# Patient Record
Sex: MALE | Race: WHITE | NOT HISPANIC OR LATINO | Employment: OTHER | URBAN - METROPOLITAN AREA
[De-identification: names, ages, dates, MRNs, and addresses within clinical notes are randomized per-mention and may not be internally consistent; named-entity substitution may affect disease eponyms.]

---

## 2018-01-15 PROBLEM — M25.561 CHRONIC PAIN OF BOTH KNEES: Status: ACTIVE | Noted: 2018-01-15

## 2018-01-15 PROBLEM — M17.0 PRIMARY OSTEOARTHRITIS OF BOTH KNEES: Status: ACTIVE | Noted: 2018-01-15

## 2018-01-15 PROBLEM — E66.9 OBESITY (BMI 35.0-39.9 WITHOUT COMORBIDITY): Status: ACTIVE | Noted: 2018-01-15

## 2018-01-15 PROBLEM — I10 ESSENTIAL HYPERTENSION: Status: ACTIVE | Noted: 2018-01-15

## 2018-01-15 PROBLEM — M25.562 CHRONIC PAIN OF BOTH KNEES: Status: ACTIVE | Noted: 2018-01-15

## 2018-01-15 PROBLEM — G89.29 CHRONIC PAIN OF BOTH KNEES: Status: ACTIVE | Noted: 2018-01-15

## 2019-01-11 PROBLEM — Z13.220 SCREENING CHOLESTEROL LEVEL: Status: ACTIVE | Noted: 2019-01-11

## 2019-01-11 PROBLEM — Z12.5 SCREENING PSA (PROSTATE SPECIFIC ANTIGEN): Status: ACTIVE | Noted: 2019-01-11

## 2019-01-11 PROBLEM — Z00.00 PREVENTATIVE HEALTH CARE: Status: ACTIVE | Noted: 2019-01-11

## 2019-01-11 PROBLEM — Z13.1 SCREENING FOR DIABETES MELLITUS: Status: ACTIVE | Noted: 2019-01-11

## 2019-01-11 PROBLEM — Z23 NEED FOR IMMUNIZATION AGAINST INFLUENZA: Status: ACTIVE | Noted: 2019-01-11

## 2019-01-16 PROCEDURE — 81001 URINALYSIS AUTO W/SCOPE: CPT | Performed by: INTERNAL MEDICINE

## 2020-06-01 PROBLEM — M21.619 BUNION: Status: ACTIVE | Noted: 2020-06-01

## 2020-06-01 PROBLEM — M79.671 PAIN IN BOTH FEET: Status: ACTIVE | Noted: 2020-06-01

## 2020-06-01 PROBLEM — M79.672 PAIN IN BOTH FEET: Status: ACTIVE | Noted: 2020-06-01

## 2020-06-01 PROBLEM — Z01.818 PRE-OPERATIVE EXAMINATION: Status: ACTIVE | Noted: 2020-06-01

## 2020-06-01 PROBLEM — Z13.6 SCREENING FOR CARDIOVASCULAR CONDITION: Status: ACTIVE | Noted: 2020-06-01

## 2020-06-01 PROBLEM — R73.01 IMPAIRED FASTING BLOOD SUGAR: Status: ACTIVE | Noted: 2020-06-01

## 2020-06-01 PROBLEM — R94.31 ABNORMAL ECG: Status: ACTIVE | Noted: 2020-06-01

## 2021-07-21 PROBLEM — Z00.00 MEDICARE ANNUAL WELLNESS VISIT, SUBSEQUENT: Status: ACTIVE | Noted: 2021-07-21

## 2021-07-21 PROBLEM — Z13.228 SCREENING FOR METABOLIC DISORDER: Status: ACTIVE | Noted: 2021-07-21

## 2021-07-21 PROBLEM — D22.9 ATYPICAL NEVUS: Status: ACTIVE | Noted: 2021-07-21

## 2021-07-21 PROBLEM — R94.31 ABNORMAL ECG: Status: RESOLVED | Noted: 2020-06-01 | Resolved: 2021-07-21

## 2021-07-21 PROBLEM — H91.13 PRESBYCUSIS OF BOTH EARS: Status: ACTIVE | Noted: 2021-07-21

## 2021-07-21 PROBLEM — Z01.818 PRE-OPERATIVE EXAMINATION: Status: RESOLVED | Noted: 2020-06-01 | Resolved: 2021-07-21

## 2024-02-16 DIAGNOSIS — J84.112 IDIOPATHIC PULMONARY FIBROSIS (CMD): Primary | ICD-10-CM

## 2024-02-27 ENCOUNTER — HOSPITAL ENCOUNTER (OUTPATIENT)
Dept: RESPIRATORY THERAPY | Age: 74
Discharge: HOME | End: 2024-02-27
Attending: INTERNAL MEDICINE

## 2024-02-27 DIAGNOSIS — J84.112 IDIOPATHIC PULMONARY FIBROSIS (CMD): ICD-10-CM

## 2024-02-27 PROCEDURE — 94453 HAST W/SUPPL OXYGEN TITRJ: CPT

## 2025-03-27 RX ORDER — PIRFENIDONE 267 MG/1
3 CAPSULE ORAL 3 TIMES DAILY
COMMUNITY

## 2025-03-27 RX ORDER — METOPROLOL SUCCINATE 25 MG/1
25 TABLET, EXTENDED RELEASE ORAL DAILY
COMMUNITY

## 2025-03-27 RX ORDER — NAPROXEN 500 MG/1
500 TABLET ORAL 2 TIMES DAILY WITH MEALS
COMMUNITY
End: 2025-04-23

## 2025-03-28 ENCOUNTER — LABORATORY ENCOUNTER (OUTPATIENT)
Dept: LAB | Facility: HOSPITAL | Age: 75
End: 2025-03-28
Attending: ORTHOPAEDIC SURGERY
Payer: MEDICARE

## 2025-03-28 DIAGNOSIS — Z13.220 SCREENING CHOLESTEROL LEVEL: ICD-10-CM

## 2025-03-28 DIAGNOSIS — M17.0 PRIMARY OSTEOARTHRITIS OF BOTH KNEES: ICD-10-CM

## 2025-03-28 DIAGNOSIS — E66.9 OBESITY (BMI 35.0-39.9 WITHOUT COMORBIDITY): ICD-10-CM

## 2025-03-28 DIAGNOSIS — Z23 NEED FOR IMMUNIZATION AGAINST INFLUENZA: ICD-10-CM

## 2025-03-28 DIAGNOSIS — R73.01 IMPAIRED FASTING BLOOD SUGAR: ICD-10-CM

## 2025-03-28 DIAGNOSIS — M21.619 BUNION: ICD-10-CM

## 2025-03-28 DIAGNOSIS — M25.562 CHRONIC PAIN OF BOTH KNEES: ICD-10-CM

## 2025-03-28 DIAGNOSIS — H91.13 PRESBYCUSIS OF BOTH EARS: ICD-10-CM

## 2025-03-28 DIAGNOSIS — M79.671 PAIN IN BOTH FEET: ICD-10-CM

## 2025-03-28 DIAGNOSIS — D22.9 ATYPICAL NEVUS: ICD-10-CM

## 2025-03-28 DIAGNOSIS — Z01.818 PRE-OP TESTING: ICD-10-CM

## 2025-03-28 DIAGNOSIS — Z13.228 SCREENING FOR METABOLIC DISORDER: ICD-10-CM

## 2025-03-28 DIAGNOSIS — Z00.00 MEDICARE ANNUAL WELLNESS VISIT, SUBSEQUENT: ICD-10-CM

## 2025-03-28 DIAGNOSIS — M79.672 PAIN IN BOTH FEET: ICD-10-CM

## 2025-03-28 DIAGNOSIS — Z00.00 PREVENTATIVE HEALTH CARE: ICD-10-CM

## 2025-03-28 DIAGNOSIS — I10 ESSENTIAL HYPERTENSION: ICD-10-CM

## 2025-03-28 DIAGNOSIS — M25.561 CHRONIC PAIN OF BOTH KNEES: ICD-10-CM

## 2025-03-28 DIAGNOSIS — Z13.6 SCREENING FOR CARDIOVASCULAR CONDITION: ICD-10-CM

## 2025-03-28 DIAGNOSIS — G89.29 CHRONIC PAIN OF BOTH KNEES: ICD-10-CM

## 2025-03-28 LAB
ANTIBODY SCREEN: NEGATIVE
APTT PPP: 27.3 SECONDS (ref 23–36)
INR BLD: 0.99 (ref 0.8–1.2)
PROTHROMBIN TIME: 13.2 SECONDS (ref 11.6–14.8)
RH BLOOD TYPE: POSITIVE

## 2025-03-28 PROCEDURE — 36415 COLL VENOUS BLD VENIPUNCTURE: CPT

## 2025-03-28 PROCEDURE — 86850 RBC ANTIBODY SCREEN: CPT

## 2025-03-28 PROCEDURE — 85730 THROMBOPLASTIN TIME PARTIAL: CPT

## 2025-03-28 PROCEDURE — 86901 BLOOD TYPING SEROLOGIC RH(D): CPT

## 2025-03-28 PROCEDURE — 86900 BLOOD TYPING SEROLOGIC ABO: CPT

## 2025-03-28 PROCEDURE — 85610 PROTHROMBIN TIME: CPT

## 2025-04-08 ENCOUNTER — ANESTHESIA EVENT (OUTPATIENT)
Dept: SURGERY | Facility: HOSPITAL | Age: 75
End: 2025-04-08
Payer: MEDICARE

## 2025-04-21 ENCOUNTER — HOSPITAL ENCOUNTER (INPATIENT)
Facility: HOSPITAL | Age: 75
LOS: 2 days | Discharge: HOME HEALTH | End: 2025-04-23
Attending: ORTHOPAEDIC SURGERY | Admitting: ORTHOPAEDIC SURGERY
Payer: MEDICARE

## 2025-04-21 ENCOUNTER — ANESTHESIA (OUTPATIENT)
Dept: SURGERY | Facility: HOSPITAL | Age: 75
End: 2025-04-21
Payer: MEDICARE

## 2025-04-21 ENCOUNTER — APPOINTMENT (OUTPATIENT)
Dept: GENERAL RADIOLOGY | Facility: HOSPITAL | Age: 75
End: 2025-04-21
Attending: ORTHOPAEDIC SURGERY
Payer: MEDICARE

## 2025-04-21 DIAGNOSIS — Z13.6 SCREENING FOR CARDIOVASCULAR CONDITION: ICD-10-CM

## 2025-04-21 DIAGNOSIS — Z23 NEED FOR IMMUNIZATION AGAINST INFLUENZA: ICD-10-CM

## 2025-04-21 DIAGNOSIS — G89.29 CHRONIC PAIN OF BOTH KNEES: ICD-10-CM

## 2025-04-21 DIAGNOSIS — M21.619 BUNION: ICD-10-CM

## 2025-04-21 DIAGNOSIS — Z00.00 MEDICARE ANNUAL WELLNESS VISIT, SUBSEQUENT: ICD-10-CM

## 2025-04-21 DIAGNOSIS — M79.671 PAIN IN BOTH FEET: ICD-10-CM

## 2025-04-21 DIAGNOSIS — R73.01 IMPAIRED FASTING BLOOD SUGAR: ICD-10-CM

## 2025-04-21 DIAGNOSIS — H91.13 PRESBYCUSIS OF BOTH EARS: ICD-10-CM

## 2025-04-21 DIAGNOSIS — M25.561 CHRONIC PAIN OF BOTH KNEES: ICD-10-CM

## 2025-04-21 DIAGNOSIS — M25.562 CHRONIC PAIN OF BOTH KNEES: ICD-10-CM

## 2025-04-21 DIAGNOSIS — M17.0 PRIMARY OSTEOARTHRITIS OF BOTH KNEES: ICD-10-CM

## 2025-04-21 DIAGNOSIS — Z13.228 SCREENING FOR METABOLIC DISORDER: ICD-10-CM

## 2025-04-21 DIAGNOSIS — M43.17 SPONDYLOLISTHESIS AT L5-S1 LEVEL: ICD-10-CM

## 2025-04-21 DIAGNOSIS — M79.672 PAIN IN BOTH FEET: ICD-10-CM

## 2025-04-21 DIAGNOSIS — Z13.220 SCREENING CHOLESTEROL LEVEL: ICD-10-CM

## 2025-04-21 DIAGNOSIS — E66.9 OBESITY (BMI 35.0-39.9 WITHOUT COMORBIDITY): ICD-10-CM

## 2025-04-21 DIAGNOSIS — I10 ESSENTIAL HYPERTENSION: ICD-10-CM

## 2025-04-21 DIAGNOSIS — D22.9 ATYPICAL NEVUS: Primary | ICD-10-CM

## 2025-04-21 DIAGNOSIS — Z00.00 PREVENTATIVE HEALTH CARE: ICD-10-CM

## 2025-04-21 PROBLEM — M54.16 LUMBAR RADICULOPATHY: Status: ACTIVE | Noted: 2025-04-21

## 2025-04-21 LAB — RH BLOOD TYPE: POSITIVE

## 2025-04-21 PROCEDURE — 0SG30AJ FUSION OF LUMBOSACRAL JOINT WITH INTERBODY FUSION DEVICE, POSTERIOR APPROACH, ANTERIOR COLUMN, OPEN APPROACH: ICD-10-PCS | Performed by: ORTHOPAEDIC SURGERY

## 2025-04-21 PROCEDURE — 00NY0ZZ RELEASE LUMBAR SPINAL CORD, OPEN APPROACH: ICD-10-PCS | Performed by: ORTHOPAEDIC SURGERY

## 2025-04-21 PROCEDURE — P9045 ALBUMIN (HUMAN), 5%, 250 ML: HCPCS

## 2025-04-21 PROCEDURE — 4A11X4G MONITORING OF PERIPHERAL NERVOUS ELECTRICAL ACTIVITY, INTRAOPERATIVE, EXTERNAL APPROACH: ICD-10-PCS | Performed by: ORTHOPAEDIC SURGERY

## 2025-04-21 PROCEDURE — 01NB0ZZ RELEASE LUMBAR NERVE, OPEN APPROACH: ICD-10-PCS | Performed by: ORTHOPAEDIC SURGERY

## 2025-04-21 PROCEDURE — 94660 CPAP INITIATION&MGMT: CPT

## 2025-04-21 PROCEDURE — XNH7058 INSERTION OF INTERNAL FIXATION DEVICE WITH TULIP CONNECTOR INTO LEFT PELVIC BONE, OPEN APPROACH, NEW TECHNOLOGY GROUP 8: ICD-10-PCS | Performed by: ORTHOPAEDIC SURGERY

## 2025-04-21 PROCEDURE — 76000 FLUOROSCOPY <1 HR PHYS/QHP: CPT | Performed by: ORTHOPAEDIC SURGERY

## 2025-04-21 PROCEDURE — XNH6058 INSERTION OF INTERNAL FIXATION DEVICE WITH TULIP CONNECTOR INTO RIGHT PELVIC BONE, OPEN APPROACH, NEW TECHNOLOGY GROUP 8: ICD-10-PCS | Performed by: ORTHOPAEDIC SURGERY

## 2025-04-21 PROCEDURE — C1889 IMPLANT/INSERT DEVICE, NOC: HCPCS | Performed by: ORTHOPAEDIC SURGERY

## 2025-04-21 PROCEDURE — 0SG3071 FUSION OF LUMBOSACRAL JOINT WITH AUTOLOGOUS TISSUE SUBSTITUTE, POSTERIOR APPROACH, POSTERIOR COLUMN, OPEN APPROACH: ICD-10-PCS | Performed by: ORTHOPAEDIC SURGERY

## 2025-04-21 PROCEDURE — 94760 N-INVAS EAR/PLS OXIMETRY 1: CPT

## 2025-04-21 PROCEDURE — 0ST40ZZ RESECTION OF LUMBOSACRAL DISC, OPEN APPROACH: ICD-10-PCS | Performed by: ORTHOPAEDIC SURGERY

## 2025-04-21 DEVICE — SHANK SPNL SCR 7.5X45MM 2C MOD RELINE MAS: Type: IMPLANTABLE DEVICE | Site: BACK | Status: FUNCTIONAL

## 2025-04-21 DEVICE — I-FACTOR PUTTY, 1.0CC SYRINGE
Type: IMPLANTABLE DEVICE | Site: BACK | Status: FUNCTIONAL
Brand: I-FACTOR PEPTIDE ENHANCED BONE GRAFT

## 2025-04-21 DEVICE — BONE GRAFT KIT 7510200 INFUSE SMALL
Type: IMPLANTABLE DEVICE | Site: BACK | Status: FUNCTIONAL
Brand: INFUSE® BONE GRAFT

## 2025-04-21 DEVICE — IMPLANTABLE DEVICE: Type: IMPLANTABLE DEVICE | Site: BACK | Status: FUNCTIONAL

## 2025-04-21 DEVICE — K WIRE FIX NIT BVL BLNT TIP PRECEPT: Type: IMPLANTABLE DEVICE | Site: BACK

## 2025-04-21 DEVICE — TLX20, 7X11X26MM 20°
Type: IMPLANTABLE DEVICE | Site: BACK | Status: FUNCTIONAL
Brand: TLX

## 2025-04-21 DEVICE — GRAFT BNE SUB 5CC M MTRX OSTEOCEL PRO: Type: IMPLANTABLE DEVICE | Site: BACK | Status: FUNCTIONAL

## 2025-04-21 DEVICE — SCREW SPNL 5.5MM LOK OPN TULIP RELINE: Type: IMPLANTABLE DEVICE | Site: BACK | Status: FUNCTIONAL

## 2025-04-21 DEVICE — HEAD SPNL SCR MULTIAXIAL EXTN MOD REDUC TULIP: Type: IMPLANTABLE DEVICE | Site: BACK | Status: FUNCTIONAL

## 2025-04-21 DEVICE — GRAFT BNE SUB MTRX C OSTEOCEL PRO: Type: IMPLANTABLE DEVICE | Site: BACK | Status: FUNCTIONAL

## 2025-04-21 DEVICE — DURASEAL® EXACT SPINAL SEALANT SYSTEM 5ML 5 PACK
Type: IMPLANTABLE DEVICE | Site: BACK | Status: FUNCTIONAL
Brand: DURASEAL EXACT SPINAL SEALANT SYSTEM

## 2025-04-21 RX ORDER — HYDROMORPHONE HYDROCHLORIDE 1 MG/ML
0.2 INJECTION, SOLUTION INTRAMUSCULAR; INTRAVENOUS; SUBCUTANEOUS EVERY 5 MIN PRN
Status: DISCONTINUED | OUTPATIENT
Start: 2025-04-21 | End: 2025-04-21 | Stop reason: HOSPADM

## 2025-04-21 RX ORDER — ACETAMINOPHEN 10 MG/ML
INJECTION, SOLUTION INTRAVENOUS AS NEEDED
Status: DISCONTINUED | OUTPATIENT
Start: 2025-04-21 | End: 2025-04-21 | Stop reason: SURG

## 2025-04-21 RX ORDER — BUPIVACAINE HYDROCHLORIDE 5 MG/ML
INJECTION, SOLUTION EPIDURAL; INTRACAUDAL; PERINEURAL AS NEEDED
Status: DISCONTINUED | OUTPATIENT
Start: 2025-04-21 | End: 2025-04-21 | Stop reason: HOSPADM

## 2025-04-21 RX ORDER — ACETAMINOPHEN 500 MG
1000 TABLET ORAL ONCE
Status: DISCONTINUED | OUTPATIENT
Start: 2025-04-21 | End: 2025-04-21 | Stop reason: HOSPADM

## 2025-04-21 RX ORDER — METOCLOPRAMIDE HYDROCHLORIDE 5 MG/ML
10 INJECTION INTRAMUSCULAR; INTRAVENOUS EVERY 8 HOURS PRN
Status: DISCONTINUED | OUTPATIENT
Start: 2025-04-21 | End: 2025-04-21 | Stop reason: HOSPADM

## 2025-04-21 RX ORDER — ONDANSETRON 2 MG/ML
4 INJECTION INTRAMUSCULAR; INTRAVENOUS EVERY 6 HOURS PRN
Status: DISCONTINUED | OUTPATIENT
Start: 2025-04-21 | End: 2025-04-21 | Stop reason: HOSPADM

## 2025-04-21 RX ORDER — DIAZEPAM 10 MG/2ML
5 INJECTION, SOLUTION INTRAMUSCULAR; INTRAVENOUS ONCE AS NEEDED
Status: DISCONTINUED | OUTPATIENT
Start: 2025-04-21 | End: 2025-04-21 | Stop reason: HOSPADM

## 2025-04-21 RX ORDER — VANCOMYCIN HYDROCHLORIDE 1 G/20ML
INJECTION, POWDER, LYOPHILIZED, FOR SOLUTION INTRAVENOUS AS NEEDED
Status: DISCONTINUED | OUTPATIENT
Start: 2025-04-21 | End: 2025-04-21 | Stop reason: HOSPADM

## 2025-04-21 RX ORDER — LIDOCAINE HYDROCHLORIDE AND EPINEPHRINE 10; 10 MG/ML; UG/ML
INJECTION, SOLUTION INFILTRATION; PERINEURAL AS NEEDED
Status: DISCONTINUED | OUTPATIENT
Start: 2025-04-21 | End: 2025-04-21 | Stop reason: HOSPADM

## 2025-04-21 RX ORDER — ONDANSETRON 2 MG/ML
4 INJECTION INTRAMUSCULAR; INTRAVENOUS EVERY 6 HOURS PRN
Status: DISCONTINUED | OUTPATIENT
Start: 2025-04-21 | End: 2025-04-23

## 2025-04-21 RX ORDER — SENNOSIDES 8.6 MG
17.2 TABLET ORAL NIGHTLY
Status: DISCONTINUED | OUTPATIENT
Start: 2025-04-21 | End: 2025-04-23

## 2025-04-21 RX ORDER — DEXAMETHASONE SODIUM PHOSPHATE 4 MG/ML
VIAL (ML) INJECTION AS NEEDED
Status: DISCONTINUED | OUTPATIENT
Start: 2025-04-21 | End: 2025-04-21 | Stop reason: SURG

## 2025-04-21 RX ORDER — DOCUSATE SODIUM 100 MG/1
100 CAPSULE, LIQUID FILLED ORAL 2 TIMES DAILY
Status: DISCONTINUED | OUTPATIENT
Start: 2025-04-21 | End: 2025-04-23

## 2025-04-21 RX ORDER — NALOXONE HYDROCHLORIDE 0.4 MG/ML
80 INJECTION, SOLUTION INTRAMUSCULAR; INTRAVENOUS; SUBCUTANEOUS AS NEEDED
Status: DISCONTINUED | OUTPATIENT
Start: 2025-04-21 | End: 2025-04-21 | Stop reason: HOSPADM

## 2025-04-21 RX ORDER — ONDANSETRON 2 MG/ML
INJECTION INTRAMUSCULAR; INTRAVENOUS AS NEEDED
Status: DISCONTINUED | OUTPATIENT
Start: 2025-04-21 | End: 2025-04-21 | Stop reason: SURG

## 2025-04-21 RX ORDER — DIPHENHYDRAMINE HYDROCHLORIDE 50 MG/ML
12.5 INJECTION, SOLUTION INTRAMUSCULAR; INTRAVENOUS AS NEEDED
Status: DISCONTINUED | OUTPATIENT
Start: 2025-04-21 | End: 2025-04-21 | Stop reason: HOSPADM

## 2025-04-21 RX ORDER — METOCLOPRAMIDE HYDROCHLORIDE 5 MG/ML
10 INJECTION INTRAMUSCULAR; INTRAVENOUS EVERY 8 HOURS PRN
Status: DISCONTINUED | OUTPATIENT
Start: 2025-04-21 | End: 2025-04-23

## 2025-04-21 RX ORDER — CYCLOBENZAPRINE HCL 10 MG
10 TABLET ORAL 3 TIMES DAILY PRN
COMMUNITY

## 2025-04-21 RX ORDER — ASPIRIN 325 MG
325 TABLET ORAL DAILY
Status: ON HOLD | COMMUNITY
End: 2025-04-23

## 2025-04-21 RX ORDER — POLYETHYLENE GLYCOL 3350 17 G/17G
17 POWDER, FOR SOLUTION ORAL DAILY PRN
Status: DISCONTINUED | OUTPATIENT
Start: 2025-04-21 | End: 2025-04-23

## 2025-04-21 RX ORDER — CYCLOBENZAPRINE HCL 5 MG
2.5 TABLET ORAL EVERY 6 HOURS PRN
Status: DISCONTINUED | OUTPATIENT
Start: 2025-04-21 | End: 2025-04-23

## 2025-04-21 RX ORDER — OXYCODONE AND ACETAMINOPHEN 5; 325 MG/1; MG/1
1 TABLET ORAL EVERY 6 HOURS PRN
COMMUNITY

## 2025-04-21 RX ORDER — TRANEXAMIC ACID 10 MG/ML
INJECTION, SOLUTION INTRAVENOUS AS NEEDED
Status: DISCONTINUED | OUTPATIENT
Start: 2025-04-21 | End: 2025-04-21 | Stop reason: SURG

## 2025-04-21 RX ORDER — MULTIVIT WITH MINERALS/LUTEIN
1000 TABLET ORAL DAILY
COMMUNITY

## 2025-04-21 RX ORDER — REMIFENTANIL HYDROCHLORIDE 1 MG/ML
1 INJECTION, POWDER, LYOPHILIZED, FOR SOLUTION INTRAVENOUS ONCE
Refills: 0 | Status: DISCONTINUED | OUTPATIENT
Start: 2025-04-21 | End: 2025-04-21 | Stop reason: HOSPADM

## 2025-04-21 RX ORDER — PHENYLEPHRINE HCL 10 MG/ML
VIAL (ML) INJECTION AS NEEDED
Status: DISCONTINUED | OUTPATIENT
Start: 2025-04-21 | End: 2025-04-21 | Stop reason: SURG

## 2025-04-21 RX ORDER — SODIUM CHLORIDE, SODIUM LACTATE, POTASSIUM CHLORIDE, CALCIUM CHLORIDE 600; 310; 30; 20 MG/100ML; MG/100ML; MG/100ML; MG/100ML
INJECTION, SOLUTION INTRAVENOUS CONTINUOUS
Status: DISCONTINUED | OUTPATIENT
Start: 2025-04-21 | End: 2025-04-21 | Stop reason: HOSPADM

## 2025-04-21 RX ORDER — ALBUMIN HUMAN 50 G/1000ML
SOLUTION INTRAVENOUS
Status: COMPLETED
Start: 2025-04-21 | End: 2025-04-21

## 2025-04-21 RX ORDER — HYDROMORPHONE HYDROCHLORIDE 1 MG/ML
0.4 INJECTION, SOLUTION INTRAMUSCULAR; INTRAVENOUS; SUBCUTANEOUS EVERY 5 MIN PRN
Status: DISCONTINUED | OUTPATIENT
Start: 2025-04-21 | End: 2025-04-21 | Stop reason: HOSPADM

## 2025-04-21 RX ORDER — VANCOMYCIN 1.75 GRAM/500 ML IN 0.9 % SODIUM CHLORIDE INTRAVENOUS
15 ONCE
Status: COMPLETED | OUTPATIENT
Start: 2025-04-22 | End: 2025-04-22

## 2025-04-21 RX ORDER — ALBUMIN HUMAN 50 G/1000ML
12.5 SOLUTION INTRAVENOUS ONCE
Status: COMPLETED | OUTPATIENT
Start: 2025-04-21 | End: 2025-04-21

## 2025-04-21 RX ORDER — MEPERIDINE HYDROCHLORIDE 25 MG/ML
12.5 INJECTION INTRAMUSCULAR; INTRAVENOUS; SUBCUTANEOUS AS NEEDED
Status: DISCONTINUED | OUTPATIENT
Start: 2025-04-21 | End: 2025-04-21 | Stop reason: HOSPADM

## 2025-04-21 RX ORDER — HYDROMORPHONE HYDROCHLORIDE 1 MG/ML
0.6 INJECTION, SOLUTION INTRAMUSCULAR; INTRAVENOUS; SUBCUTANEOUS EVERY 5 MIN PRN
Status: DISCONTINUED | OUTPATIENT
Start: 2025-04-21 | End: 2025-04-21 | Stop reason: HOSPADM

## 2025-04-21 RX ORDER — ROCURONIUM BROMIDE 10 MG/ML
INJECTION, SOLUTION INTRAVENOUS AS NEEDED
Status: DISCONTINUED | OUTPATIENT
Start: 2025-04-21 | End: 2025-04-21 | Stop reason: SURG

## 2025-04-21 RX ORDER — OXYCODONE HYDROCHLORIDE 5 MG/1
5 TABLET ORAL EVERY 4 HOURS PRN
Status: DISCONTINUED | OUTPATIENT
Start: 2025-04-21 | End: 2025-04-23

## 2025-04-21 RX ORDER — LIDOCAINE HYDROCHLORIDE 10 MG/ML
INJECTION, SOLUTION EPIDURAL; INFILTRATION; INTRACAUDAL; PERINEURAL AS NEEDED
Status: DISCONTINUED | OUTPATIENT
Start: 2025-04-21 | End: 2025-04-21 | Stop reason: SURG

## 2025-04-21 RX ORDER — DIAZEPAM 2 MG/1
2 TABLET ORAL EVERY 6 HOURS PRN
Status: DISCONTINUED | OUTPATIENT
Start: 2025-04-21 | End: 2025-04-23

## 2025-04-21 RX ORDER — METHOCARBAMOL 100 MG/ML
INJECTION, SOLUTION INTRAMUSCULAR; INTRAVENOUS
Status: COMPLETED
Start: 2025-04-21 | End: 2025-04-21

## 2025-04-21 RX ORDER — KETOROLAC TROMETHAMINE 30 MG/ML
INJECTION, SOLUTION INTRAMUSCULAR; INTRAVENOUS AS NEEDED
Status: DISCONTINUED | OUTPATIENT
Start: 2025-04-21 | End: 2025-04-21 | Stop reason: SURG

## 2025-04-21 RX ORDER — SODIUM PHOSPHATE, DIBASIC AND SODIUM PHOSPHATE, MONOBASIC 7; 19 G/230ML; G/230ML
1 ENEMA RECTAL ONCE AS NEEDED
Status: DISCONTINUED | OUTPATIENT
Start: 2025-04-21 | End: 2025-04-23

## 2025-04-21 RX ORDER — LABETALOL HYDROCHLORIDE 5 MG/ML
5 INJECTION, SOLUTION INTRAVENOUS EVERY 5 MIN PRN
Status: DISCONTINUED | OUTPATIENT
Start: 2025-04-21 | End: 2025-04-21 | Stop reason: HOSPADM

## 2025-04-21 RX ORDER — BISACODYL 10 MG
10 SUPPOSITORY, RECTAL RECTAL
Status: DISCONTINUED | OUTPATIENT
Start: 2025-04-21 | End: 2025-04-23

## 2025-04-21 RX ORDER — DIPHENHYDRAMINE HYDROCHLORIDE 50 MG/ML
25 INJECTION, SOLUTION INTRAMUSCULAR; INTRAVENOUS EVERY 4 HOURS PRN
Status: DISCONTINUED | OUTPATIENT
Start: 2025-04-21 | End: 2025-04-23

## 2025-04-21 RX ORDER — MIDAZOLAM HYDROCHLORIDE 1 MG/ML
INJECTION INTRAMUSCULAR; INTRAVENOUS AS NEEDED
Status: DISCONTINUED | OUTPATIENT
Start: 2025-04-21 | End: 2025-04-21 | Stop reason: SURG

## 2025-04-21 RX ORDER — GLYCOPYRROLATE 0.2 MG/ML
INJECTION, SOLUTION INTRAMUSCULAR; INTRAVENOUS AS NEEDED
Status: DISCONTINUED | OUTPATIENT
Start: 2025-04-21 | End: 2025-04-21 | Stop reason: SURG

## 2025-04-21 RX ORDER — HYDROMORPHONE HYDROCHLORIDE 1 MG/ML
INJECTION, SOLUTION INTRAMUSCULAR; INTRAVENOUS; SUBCUTANEOUS
Status: COMPLETED
Start: 2025-04-21 | End: 2025-04-21

## 2025-04-21 RX ORDER — SODIUM CHLORIDE, SODIUM LACTATE, POTASSIUM CHLORIDE, CALCIUM CHLORIDE 600; 310; 30; 20 MG/100ML; MG/100ML; MG/100ML; MG/100ML
INJECTION, SOLUTION INTRAVENOUS CONTINUOUS
Status: DISCONTINUED | OUTPATIENT
Start: 2025-04-21 | End: 2025-04-23

## 2025-04-21 RX ORDER — DIPHENHYDRAMINE HCL 25 MG
25 CAPSULE ORAL EVERY 4 HOURS PRN
Status: DISCONTINUED | OUTPATIENT
Start: 2025-04-21 | End: 2025-04-23

## 2025-04-21 RX ORDER — EPHEDRINE SULFATE 50 MG/ML
INJECTION INTRAVENOUS AS NEEDED
Status: DISCONTINUED | OUTPATIENT
Start: 2025-04-21 | End: 2025-04-21 | Stop reason: SURG

## 2025-04-21 RX ORDER — HYDROMORPHONE HYDROCHLORIDE 1 MG/ML
0.4 INJECTION, SOLUTION INTRAMUSCULAR; INTRAVENOUS; SUBCUTANEOUS EVERY 2 HOUR PRN
Status: DISCONTINUED | OUTPATIENT
Start: 2025-04-21 | End: 2025-04-23

## 2025-04-21 RX ORDER — ESMOLOL HYDROCHLORIDE 10 MG/ML
INJECTION INTRAVENOUS AS NEEDED
Status: DISCONTINUED | OUTPATIENT
Start: 2025-04-21 | End: 2025-04-21 | Stop reason: SURG

## 2025-04-21 RX ORDER — METHOCARBAMOL 100 MG/ML
1000 INJECTION, SOLUTION INTRAMUSCULAR; INTRAVENOUS ONCE
Status: COMPLETED | OUTPATIENT
Start: 2025-04-21 | End: 2025-04-21

## 2025-04-21 RX ORDER — ATORVASTATIN CALCIUM 10 MG/1
10 TABLET, FILM COATED ORAL NIGHTLY
COMMUNITY

## 2025-04-21 RX ORDER — HYDROMORPHONE HYDROCHLORIDE 1 MG/ML
0.2 INJECTION, SOLUTION INTRAMUSCULAR; INTRAVENOUS; SUBCUTANEOUS EVERY 2 HOUR PRN
Status: DISCONTINUED | OUTPATIENT
Start: 2025-04-21 | End: 2025-04-23

## 2025-04-21 RX ADMIN — SODIUM CHLORIDE, SODIUM LACTATE, POTASSIUM CHLORIDE, CALCIUM CHLORIDE: 600; 310; 30; 20 INJECTION, SOLUTION INTRAVENOUS at 13:27:00

## 2025-04-21 RX ADMIN — ROCURONIUM BROMIDE 15 MG: 10 INJECTION, SOLUTION INTRAVENOUS at 11:38:00

## 2025-04-21 RX ADMIN — PHENYLEPHRINE HCL 100 MCG: 10 MG/ML VIAL (ML) INJECTION at 11:48:00

## 2025-04-21 RX ADMIN — LIDOCAINE HYDROCHLORIDE 100 MG: 10 INJECTION, SOLUTION EPIDURAL; INFILTRATION; INTRACAUDAL; PERINEURAL at 11:38:00

## 2025-04-21 RX ADMIN — SODIUM CHLORIDE, SODIUM LACTATE, POTASSIUM CHLORIDE, CALCIUM CHLORIDE: 600; 310; 30; 20 INJECTION, SOLUTION INTRAVENOUS at 14:39:00

## 2025-04-21 RX ADMIN — SODIUM CHLORIDE, SODIUM LACTATE, POTASSIUM CHLORIDE, CALCIUM CHLORIDE: 600; 310; 30; 20 INJECTION, SOLUTION INTRAVENOUS at 12:05:00

## 2025-04-21 RX ADMIN — KETOROLAC TROMETHAMINE 15 MG: 30 INJECTION, SOLUTION INTRAMUSCULAR; INTRAVENOUS at 15:10:00

## 2025-04-21 RX ADMIN — EPHEDRINE SULFATE 10 MG: 50 INJECTION INTRAVENOUS at 13:23:00

## 2025-04-21 RX ADMIN — ACETAMINOPHEN 1000 MG: 10 INJECTION, SOLUTION INTRAVENOUS at 14:58:00

## 2025-04-21 RX ADMIN — PHENYLEPHRINE HCL 100 MCG: 10 MG/ML VIAL (ML) INJECTION at 12:02:00

## 2025-04-21 RX ADMIN — PHENYLEPHRINE HCL 200 MCG: 10 MG/ML VIAL (ML) INJECTION at 16:13:00

## 2025-04-21 RX ADMIN — GLYCOPYRROLATE 0.3 MG: 0.2 INJECTION, SOLUTION INTRAMUSCULAR; INTRAVENOUS at 12:03:00

## 2025-04-21 RX ADMIN — ONDANSETRON 4 MG: 2 INJECTION INTRAMUSCULAR; INTRAVENOUS at 15:10:00

## 2025-04-21 RX ADMIN — DEXAMETHASONE SODIUM PHOSPHATE 4 MG: 4 MG/ML VIAL (ML) INJECTION at 15:10:00

## 2025-04-21 RX ADMIN — SODIUM CHLORIDE, SODIUM LACTATE, POTASSIUM CHLORIDE, CALCIUM CHLORIDE: 600; 310; 30; 20 INJECTION, SOLUTION INTRAVENOUS at 11:31:00

## 2025-04-21 RX ADMIN — MIDAZOLAM HYDROCHLORIDE 2 MG: 1 INJECTION INTRAMUSCULAR; INTRAVENOUS at 11:32:00

## 2025-04-21 RX ADMIN — TRANEXAMIC ACID 1000 MG: 10 INJECTION, SOLUTION INTRAVENOUS at 11:50:00

## 2025-04-21 RX ADMIN — SODIUM CHLORIDE, SODIUM LACTATE, POTASSIUM CHLORIDE, CALCIUM CHLORIDE: 600; 310; 30; 20 INJECTION, SOLUTION INTRAVENOUS at 15:56:00

## 2025-04-21 RX ADMIN — ESMOLOL HYDROCHLORIDE 20 MG: 10 INJECTION INTRAVENOUS at 15:35:00

## 2025-04-21 NOTE — BRIEF OP NOTE
Pre-Operative Diagnosis: SPONDYLOISTHESIS; SPINAL STENOSIS     Post-Operative Diagnosis: SPONDYLOISTHESIS; SPINAL STENOSIS      Procedure Performed:   BILATERAL LUMBAR 5 - SACRAL 1 TRANSFORAMINAL LUMBAR INTERBODY FUSION, BILATERAL PELVIC SCREWS, LUMBAR 3 - LUMBAR 4 LAMINECTOMY    Surgeons and Role:     * Spencer Vergara MD - Primary    Assistant(s):  PA: Piedad Jones PA-C     Surgical Findings: Above     Specimen: none     Estimated Blood Loss: Blood Output: 200 mL (4/21/2025  3:22 PM)      Dictation Number:      Piedad Jones PA-C  4/21/2025  3:56 PM

## 2025-04-21 NOTE — ANESTHESIA POSTPROCEDURE EVALUATION
[facility] Aranda Patient Status:  Inpatient   Age/Gender 74 year old male MRN [id number]   Location [place] POST ANESTH[facility]IA CARE UNIT Attending Spencer Vergara MD   Hosp Day # 0 PCP Rocky Torres MD       Anesthesia Post-op Note    BILATERAL LUMBAR 5 - SACRAL 1 TRANSFORAMINAL LUMBAR INTERBODY FUSION, BILATERAL PELVIC SCREWS, LUMBAR 3 - LUMBAR 4 LAMINECTOMY    Procedure Summary       Date: 04/21/25 Room / Location:  MAIN OR 11 / [facility] MAIN OR    Anesthesia Start: 1131 Anesthesia Stop: 1618    Procedures:       BILATERAL LUMBAR 5 - SACRAL 1 TRANSFORAMINAL LUMBAR INTERBODY FUSION, BILATERAL PELVIC SCREWS, LUMBAR 3 - LUMBAR 4 LAMINECTOMY (Bilateral: Spine Lumbar)      LUMBAR LAMINECTOMY 1 LEVEL (Bilateral: Spine Lumbar)      INTRAOPERATIVE NEURO MONITORING (Bilateral) Diagnosis: (SPONDYLOISTHESIS; SPINAL STENOSIS)    Surgeons: Spencer Vergara MD Anesthesiologist: Dev Gunn MD    Anesthesia Type: general ASA Status: 3            Anesthesia Type: general    Vitals Value Taken Time   BP 81/63 04/21/25 16:28   Temp 97.9 04/21/25 16:32   Pulse 103 04/21/25 16:31   Resp 19 04/21/25 16:31   SpO2 100 % 04/21/25 16:31   Vitals shown include unfiled device data.        Patient Location: PACU    Anesthesia Type: general    Airway Patency: patent and extubated    Postop Pain Control: adequate    Mental Status: mildly sedated but able to meaningfully participate in the post-anesthesia evaluation    Nausea/Vomiting: none    Cardiopulmonary/Hydration status: stable euvolemic    Complications: no apparent anesthesia related complications    Postop vital signs: stable    Dental Exam: Unchanged from Preop    Patient to be discharged from PACU when criteria met.

## 2025-04-21 NOTE — ANESTHESIA PROCEDURE NOTES
Airway  Date/Time: 4/21/2025 11:38 AM  Reason: elective    Airway not difficult    General Information and Staff   Patient location during procedure: OR  Anesthesiologist: Dev Gunn MD  Performed: anesthesiologist   Performed by: Dev Gunn MD  Authorized by: Dev Gunn MD        Indications and Patient Condition  Indications for airway management: anesthesia  Sedation level: deep      Preoxygenated: yesPatient position: sniffing    Mask difficulty assessment: 1 - vent by mask    Final Airway Details    Final airway type: endotracheal airway    Successful airway: ETT  Cuffed: yes   Successful intubation technique: direct laryngoscopy  Endotracheal tube insertion site: oral  Blade: GlideScope  Blade size: #4  ETT size (mm): 7.5    Cormack-Lehane Classification: grade I - full view of glottis  Placement verified by: capnometry   Cuff volume (mL): 8  Measured from: lips  ETT to lips (cm): 24  Number of attempts at approach: 1  Number of other approaches attempted: 0    Additional Comments  Soft bite block placed after intubation

## 2025-04-21 NOTE — ANESTHESIA PREPROCEDURE EVALUATION
PRE-OP EVALUATION    Patient Name: Erika Aranda    Admit Diagnosis: SPONDYLOISTHESIS; SPINAL STENOSIS    Pre-op Diagnosis: SPONDYLOISTHESIS; SPINAL STENOSIS    BILATERAL LUMBAR 5 - SACRAL 1 TRANSFORAMINAL LUMBAR INTERBODY FUSION, BILATERAL PELVIC SCREWS, LUMBAR 3 - LUMBAR 4 LAMINECTOMY    Anesthesia Procedure: BILATERAL LUMBAR 5 - SACRAL 1 TRANSFORAMINAL LUMBAR INTERBODY FUSION, BILATERAL PELVIC SCREWS, LUMBAR 3 - LUMBAR 4 LAMINECTOMY (Bilateral: Spine Lumbar)  LUMBAR LAMINECTOMY 1 LEVEL (Bilateral: Spine Lumbar)  INTRAOPERATIVE NEURO MONITORING (Bilateral)    Surgeons and Role:     * Spencer Vergara MD - Primary    Pre-op vitals reviewed.  Temp: 97.5 °F (36.4 °C)  Pulse: 67  Resp: 18  BP: 151/90  SpO2: 93 %  Body mass index is 32.97 kg/m².    Current medications reviewed.  Hospital Medications:  Current Medications[1]    Outpatient Medications:   Prescriptions Prior to Admission[2]    Allergies: Patient has no allergy information on record.      Anesthesia Evaluation    Patient summary reviewed.    Anesthetic Complications  (-) history of anesthetic complications         GI/Hepatic/Renal    Negative GI/hepatic/renal ROS.                             Cardiovascular      ECG reviewed.  Exercise tolerance: good     MET: >4    (+) obesity  (+) hypertension       (-) past MI              (-) CHF  (-) angina     (-) GUTIERREZ  (-) orthopnea  (-) PND     Endo/Other      (-) diabetes ('prediabetes')         (-) anemia            (+) arthritis       Pulmonary  Comment: Pulmonary fibrosis with occasional home O2 usage             (+) shortness of breath  (-) recent URI   (+) sleep apnea and CPAP      Neuro/Psych    Negative neuro/psych ROS.                                  Past Surgical History[3]  Social Hx on file[4]  History   Drug Use No     Available pre-op labs reviewed.               Airway      Mallampati: II  Mouth opening: 3 FB  TM distance: 4 - 6 cm  Neck ROM: full Cardiovascular    Cardiovascular exam  normal.  Rhythm: regular  Rate: normal  (-) murmur   Dental    Dentition appears grossly intact         Pulmonary    Pulmonary exam normal.  Breath sounds clear to auscultation bilaterally.      (-) decreased breath sounds  (-) wheezes       Other findings              ASA: 3   Plan: general  NPO status verified and patient meets guidelines.  Patient has taken beta blockers in last 24 hours.        Plan/risks discussed with: patient  Use of blood product(s) discussed with: patient    Consented to blood products.        We discussed GA w/ETT and possible scratchy throat and rarely dental damage.  We discussed analgesic plan and PONV prophylaxis.  We discussed padding in the prone position with additional use of optigards and facial foam.  We discussed anticipated extubation at end of surgery if indicated.  Pulmonary care based on events of recovery.  The patient's questions were answered and consent was attained.          [1]    [Transfer Hold] acetaminophen (Tylenol Extra Strength) tab 1,000 mg  1,000 mg Oral Once    lactated ringers infusion   Intravenous Continuous    [COMPLETED] ceFAZolin (Ancef) 2g in 10mL IV syringe premix  2 g Intravenous Once    remifentanil (Ultiva) injection 107.2 mcg  1 mcg/kg Intravenous Once   [2]   Medications Prior to Admission   Medication Sig Dispense Refill Last Dose/Taking    atorvastatin 10 MG Oral Tab Take 1 tablet (10 mg total) by mouth nightly.   4/20/2025 at  7:00 PM    aspirin 325 MG Oral Tab Take 1 tablet (325 mg total) by mouth daily.   4/7/2025    Calcium Carbonate (CALCIUM 500 OR) Take 1,000 mg by mouth daily.   4/20/2025 at  8:00 AM    CHOLECALCIFEROL OR Take 1,000 mg by mouth daily.   4/20/2025 at  8:00 AM    Ascorbic Acid (VITAMIN C) 1000 MG Oral Tab Take 1 tablet (1,000 mg total) by mouth daily.   4/20/2025 at  8:00 AM    Pirfenidone 267 MG Oral Cap Take 3 capsules by mouth in the morning, at noon, and at bedtime. PULMONARY FIBROSIS   4/20/2025 at  7:00 PM     metoprolol succinate ER 25 MG Oral Tablet 24 Hr Take 1 tablet (25 mg total) by mouth in the morning.   4/20/2025 at  8:00 PM    naproxen 500 MG Oral Tab Take 1 tablet (500 mg total) by mouth in the morning and 1 tablet (500 mg total) in the evening. Take with meals.   4/7/2025    fluticasone propionate 50 MCG/ACT Nasal Suspension 1 spray by Each Nare route nightly.   4/20/2025 at  8:00 AM    loratadine 10 MG Oral Tab Take 1 tablet (10 mg total) by mouth in the morning.   4/20/2025 at  8:00 AM    Irbesartan 300 MG Oral Tab Take 1 tablet (300 mg total) by mouth daily. 90 tablet 3 4/20/2025 at  7:00 PM    oxyCODONE-acetaminophen 5-325 MG Oral Tab Take 1 tablet by mouth every 6 (six) hours as needed for Pain. Post op       cyclobenzaprine 10 MG Oral Tab Take 1 tablet (10 mg total) by mouth 3 (three) times daily as needed for Muscle spasms. Post op      [3]   Past Surgical History:  Procedure Laterality Date    Appendectomy      [facility] acl esther maintenance      Other surgical history      WALTER bunionectomy    Tonsillectomy     [4]   Social History  Socioeconomic History    Marital status:    Tobacco Use    Smoking status: Never    Smokeless tobacco: Never   Substance and Sexual Activity    Alcohol use: Yes    Drug use: No

## 2025-04-21 NOTE — H&P
Patient was seen today, 4/21/25, by Dr. Vergara and wishes to proceed with surgical intervention. No changes from previous visit.   Agree with below:   Erika Aranda is a 74 year old male.  HPI:  Chief Complaint: Pre-Op Exam, Medication Follow-Up, Hypertension, Back Pain, and Lung Problem    Patient is here for preoperative evaluation, cardiovascular risk assessment office visit before upcoming spinal surgery  Patient has chronic Lower back pain and Sciatica  Patient states that his symptoms are getting worse for the last 7 months  Patient has intermittent sciatica, both sides, more bothersome to the right side  The patient reports constant pain, with no pain-free periods since the onset of symptoms. The pain is primarily located in the right hamstring and both calves, but it does shift to other areas. The patient reports that the pain is exacerbated by walking and is severe enough to cause discomfort even when sitting.  Patient denies having weakness or paresthesias in lower extremities. Denies having loss of bowel bladder function. Denies having saddle anesthesia  Patient denies having significant lower back traumas or injuries  Patient had MRI of Lumbar spine, it revealed Spondylolisthesis at L5 with severe compression of right and left L5 nerve roots. Large disc herniation at L3-4  Patient was seen and evaluated by Orthopedic Spine surgeon, Dr. Spencer Vergara, spinal surgery was recommended (decompression and fusion at L5-s1 to the Pelvis and lami/ discectomy at L3-4 )  No evidence for transmissible or communicable disease  Blood pressure is adequately controlled, average blood pressure at home is less than 140/90 mm Hg  Denies chest pain or palpitations  Patient has dyspnea on exertion and leg edema  Denies abdominal pain, nausea, vomiting, diarrhea or constipation  Denies shortness of breath at rest, but patient has dyspnea on exertion,, cough, reduced exercise tolerance  Denies abdominal pain, nausea,  vomiting, diarrhea or constipation  Denies headaches, dizziness or lightheadedness  Denies dysuria, urinary frequency or urgency  Patient is barely physically active, patient's exercise capacity is less than 4 METs due to Interstitial Pulmonary fibrosis, chronic hypoxemic respiratory failure  Patient does not have history of chronic kidney Disease, his baseline creatinine is under 2 mg/dL  Patient denies history of excessive bleeding  Patient does not have a history of excessive clotting, such as deep venous thrombosis or pulmonary embolism  Patient is taking his medications as prescribed  Patient denies having any side effects from his current Medications    Current Outpatient Medications  Medication Sig Dispense Refill  METOPROLOL SUCCINATE ER 25 MG Oral Tablet 24 Hr TAKE 1 TABLET (25 MG TOTAL) BY MOUTH DAILY. 90 tablet 0  naproxen 500 MG Oral Tab Take 1 tablet (500 mg total) by mouth 2 (two) times daily. 60 tablet 2  IRBESARTAN 300 MG Oral Tab TAKE 1 TABLET DAILY 90 tablet 0  DULoxetine 30 MG Oral Cap DR Particles Take 1 capsule (30 mg total) by mouth daily. 30 capsule 2  atorvastatin 10 MG Oral Tab Take 1 tablet (10 mg total) by mouth daily. 90 tablet 3  aspirin 325 MG Oral Tab  fluticasone propionate 50 MCG/ACT Nasal Suspension 1 spray by Each Nare route nightly.  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  High cholesterol  Hypertension  Kidney disease  ANDIE (obstructive sleep apnea) 10/14/21-PSG  AHI-12  Osteoarthritis  Prediabetes    Past Surgical History:  Procedure Laterality Date  APPENDECTOMY  ARTHROSCOPY OF JOINT UNLISTED Left  knee scope  OTHER SURGICAL HISTORY  WALTER bunionectomy  TONSILLECTOMY    Social History:  Social History  Tobacco Use  Smoking status: Former  Packs/day: 0.00  Years: 1 pack/day for 6.0 years (6.0 ttl pk-yrs)  Types: Cigarettes  Start date:   Quit date:   Years since quittin.2  Smokeless tobacco:  Never  Vaping Use  Vaping status: Never Used  Alcohol use: Yes  Comment: wine daily  Drug use: No    Family History  Problem Relation Age of Onset  Cancer Father  Heart Disorder Mother      REVIEW OF SYSTEMS:  GENERAL HEALTH: feels well otherwise  HEENT: No pain or discharge  RESPIRATORY: Denies shortness of breath at rest, but patient has dyspnea on exertion,, cough, reduced exercise tolerance  Patient is physically active, patient's exercise capacity is less than 4 METs due to Interstitial Pulmonary fibrosis, chronic hypoxemic respiratory failure  CARDIOVASCULAR: denies chest pain or palpitations, has dyspnea on exertion or leg edema  GI: denies abdominal pain and denies nausea, vomiting, diarrhea or constipation, denies heartburn  : denies any burning with urination, urinary frequency or urgency  NEURO: denies headaches, numbness or tingling, mental status changes  PSYCH: denies depressed mood, anxiety  MUSC: chronic lower back pain, knee pain pain  ENDO: no history of thyroid problems or weight change  HEMATOLOGICAL: Denies anemia, no abnormal bleeding or thrombosis    EXAM:  /64  Pulse 76  Temp 97.9 °F (36.6 °C) (Temporal)  Resp 16  Ht 5' 11\" (1.803 m)  Wt 258 lb (117 kg)  SpO2 95%  BMI 35.98 kg/m²  BP Readings from Last 5 Encounters:  03/21/25 : 110/64  12/03/24 : 124/70  09/18/24 : 110/60  04/23/24 : 138/88  04/02/24 : 152/90    Wt Readings from Last 5 Encounters:  03/21/25 : 258 lb (117 kg)  03/20/25 : 250 lb (113.4 kg)  03/04/25 : 250 lb (113.4 kg)  12/17/24 : 259 lb (117.5 kg)  12/03/24 : 259 lb (117.5 kg)    GENERAL: obese, well developed, well nourished,in no apparent distress  HEENT: atraumatic, normocephalic,ears and throat are clear  NECK: supple,no adenopathy,no bruits  LUNGS: clear to auscultation, no wheezing or crackles  CARDIO: RRR without murmur, S1+S2. No leg edema  GI: Soft, not tender, not distended, Normal Bowel Sounds, No Hepatosplenomegaly  MUSCULOSKELETAL: Lower back,  lumbar region tenderness and Lower back muscle spasm'  EXTREMITIES: no cyanosis, clubbing    LAB AND TEST RESULTS:    CBC:  Lab Results  Component Value Date  WBC 7.74 03/10/2025  WBC 8.00 09/09/2024  WBC 5.60 09/07/2023    Lab Results  Component Value Date  HEMOGLOBIN 15.8 07/22/2021  HGB 15.6 03/10/2025  HGB 15.9 09/09/2024  HGB 15.1 09/07/2023    Lab Results  Component Value Date   03/10/2025   09/09/2024   09/07/2023    BMP:  Lab Results  Component Value Date  GLUCOSE 108 (H) 07/22/2021    Lab Results  Component Value Date  K 4.1 03/10/2025  K 4.7 12/10/2024  K 4.7 09/09/2024    Lab Results  Component Value Date  BUN 14.0 03/10/2025  BUN 20.0 12/10/2024  BUN 18.0 09/09/2024    Lab Results  Component Value Date  CREATSERUM 1.06 03/10/2025  CREATSERUM 1.13 12/10/2024  CREATSERUM 1.09 09/09/2024    Lab Results  Component Value Date  CHOLEST 96 03/10/2025  CHOLEST 138 09/09/2024  CHOLEST 138 09/07/2023    Lab Results  Component Value Date  HDL 35 (L) 03/10/2025  HDL 36 (L) 09/09/2024  HDL 35 (L) 09/07/2023    Lab Results  Component Value Date  TRIGLY 273 (H) 07/22/2021    Lab Results  Component Value Date  LDL 38 03/10/2025  LDL 58 09/09/2024  LDL 63 09/07/2023    Lab Results  Component Value Date  AST 40 03/10/2025  AST 18 12/10/2024  AST 22 09/09/2024    Lab Results  Component Value Date  ALT 46 (H) 03/10/2025  ALT 19 12/10/2024  ALT 22 09/09/2024    Lab Results  Component Value Date  GLUCOSE 108 (H) 07/22/2021    Cholesterol:  Lab Results  Component Value Date  CHOLEST 96 03/10/2025  CHOLEST 138 09/09/2024  CHOLEST 138 09/07/2023    Lab Results  Component Value Date  HDL 35 (L) 03/10/2025  HDL 36 (L) 09/09/2024  HDL 35 (L) 09/07/2023    Lab Results  Component Value Date  TRIG 115 03/10/2025  TRIG 222 (H) 09/09/2024  TRIG 199 (H) 09/07/2023  TRIGLY 273 (H) 07/22/2021    Lab Results  Component Value Date  LDL 38 03/10/2025  LDL 58 09/09/2024  LDL 63 09/07/2023    Lab Results  Component Value  Date  AST 40 03/10/2025  AST 18 12/10/2024  AST 22 09/09/2024    Lab Results  Component Value Date  ALT 46 (H) 03/10/2025  ALT 19 12/10/2024  ALT 22 09/09/2024    Diabetes:  Lab Results  Component Value Date  A1C 5.4 03/10/2025  A1C 6.0 (H) 09/09/2024  A1C 6.1 (H) 09/07/2023   03/10/2025   09/09/2024   09/07/2023    Lab Results  Component Value Date  CHOLEST 96 03/10/2025  CHOLEST 138 09/09/2024  CHOLEST 138 09/07/2023    Lab Results  Component Value Date  HDL 35 (L) 03/10/2025  HDL 36 (L) 09/09/2024  HDL 35 (L) 09/07/2023    Lab Results  Component Value Date  LDL 38 03/10/2025  LDL 58 09/09/2024  LDL 63 09/07/2023    Lab Results  Component Value Date  TRIG 115 03/10/2025  TRIG 222 (H) 09/09/2024  TRIG 199 (H) 09/07/2023  TRIGLY 273 (H) 07/22/2021    Lab Results  Component Value Date  AST 40 03/10/2025  AST 18 12/10/2024  AST 22 09/09/2024    Lab Results  Component Value Date  ALT 46 (H) 03/10/2025  ALT 19 12/10/2024  ALT 22 09/09/2024    Micro Albumen/Creatinine: No results found for: \"MICROALBCREA\", \"MALBCREACALC\"  Thyroid:  Lab Results  Component Value Date  TSH 2.560 09/09/2024  TSH 2.330 11/27/2023  TSH 2.460 09/07/2023  T4F 0.99 09/09/2024  T4F 0.99 09/07/2023  T4F 0.98 11/10/2022    Urinalysis:  Lab Results  Component Value Date  SPECGRAVITY 1.019 03/10/2025  PH 5.5 03/10/2025  URINECOLOR YELLOW 03/10/2025  APPEARANCE Clear 09/07/2023  GLUCOSEURINE NEGATIVE 03/10/2025  KETONES TRACE (A) 03/10/2025  BLOODU NEGATIVE 03/10/2025  BILIRUBIN NEGATIVE 03/10/2025  UROBILIN <=1.0 09/07/2023  NITRITE NEGATIVE 03/10/2025  MICROSCOPIC Comment 09/07/2023    3/11/2025 MRI Lumbar Spine  IMPRESSION:    1. Grade 2 anterior spondylolisthesis L5-S1 (becoming grade 3 with weightbearing) resulting in  bilateral subarticular and severe compressive bilateral foraminal stenosis (correlate for bilateral  L5 radiculopathy).  2. Additional spondylotic and degenerative disc changes as described in detail above  resulting in  * moderate central canal, right greater than left subarticular and mild bilateral foraminal  stenosis L2-L3,  * severe central canal, right subarticular and mild lateral recess stenosis, and moderate right  with borderline left foraminal stenosis L3-L4,  * moderately severe transverse central canal stenosis, severe bilateral subarticular and mild  bilateral lateral recess stenosis L4-L5.    CARD NUC STRESS TEST LEXISCAN ([id number]/)  Order: [id number]  Status: Final result    Visible to patient: Yes (seen)    Next appt: 2025 at 10:00 AM in Spine (Spencer Vergara MD)    Dx: Pulmonary fibrosis (HCC); Essential h...  1 Result Note  Details    Reading Physician Reading Date Result Priority  Cadence Lao MD  [phone] 2024    Narrative    ----------------------------------------------------------------------------  Myocardial Perfusion Imaging    Lexiscan    SPECT    Patient: Erika Aranda  MRN: [id number]  Study Date: 2024    Location: [facility]) -   [facility], [address] Phone:  [phone]*  : 09/10/1950  Sex: M  Height: (72in) /182.9cm  Weight: (260lb) /117.9kg  Ordering Physician: Radha Brody MD, [facility]    ----------------------------------------------------------------------------  Indications: Dx: Essential hypertension [I10 (ICD-10-CM)]; Pulmonary  fibrosis (HCC) [J84.10 (ICD-10-CM)]; Dyspnea on exertion [R06.09  (ICD-10-CM)]; ANDIE on CPAP [G47.33 (ICD-10-CM)] Preoperative examination    Abnormal ECG  Screening for cardiovascular condition    ----------------------------------------------------------------------------  History: Risk factors: Hypertension. Dyslipidemia. Family history is  significant for coronary artery disease.  FHX.    ----------------------------------------------------------------------------    ----------------------------------------------------------------------------  Study data: Motion correction applied to images.  Procedure Narrative : The patient arrived at the laboratory. A baseline ECG  was recorded. Intravenous access was obtained. Pulse oximetric signals were  monitored. Lexiscan stress test was performed. Lexiscan was administered by  intravenous bolus, followed by a 5ml saline flush. The total dose was  400mcg. Pharmacologic stress was used because the patient was physically  unable to exercise.  Maximal heart rate during stress was 94bpm (64% of maximal predicted heart  rate). The maximal predicted heart rate was 147bpm. The target heart rate  was 125bpm. The rate-pressure product for the peak heart rate and blood  pressure was 63058cx Hg/min. Stress testing did not produce any symptoms  suggestive of coronary artery disease.    Electrocardiographic Data:  ECG: Normal sinus rhythm. There is evidence of an old myocardial  infarction. Low voltage. During exercise the EKG demonstrates This is a  non-diagnostic stress EKG test for ST segment depression.  SPECT Study: While at rest the patient received 8mCi Tc-99m sestamibi was  injected intravenously. on 04/17/2024 Gamma camera imaging was performed  aproximately 30 minutes after isotope injection using 180 degree SPECT  technique. While at stress the patient received 26.4mCi Tc-99m sestamibi was  injected intravenously. on 04/17/2024 Gamma camera imaging was performed  aproximately 30 minutes after isotope injection using 180 degree gated SPECT  technique with wall motion and ejection fraction analysis.  Stress Protocol:    +-----------------------------+--+-----------+---+  !Stage !HR!BP !Sat!  +-----------------------------+--+-----------+---+  !Sitting !77!120/80 (93)!95%!  +-----------------------------+--+-----------+---+  !Regadenoson (Lexiscan); 1  min!90!100/80 (87)!---!  +-----------------------------+--+-----------+---+  !Regadenoson (Lexiscan); 2 min!93!100/80 (87)!---!  +-----------------------------+--+-----------+---+  !Regadenoson (Lexiscan); 3 min!94!110/80 (90)!---!  +-----------------------------+--+-----------+---+  !Regadenoson (Lexiscan); 4 min!91!110/80 (90)!---!  +-----------------------------+--+-----------+---+  !Recovery 1 !90!110/80 (90)!---!  +-----------------------------+--+-----------+---+  !Recovery 3 !90!100/80 (87)!---!  +-----------------------------+--+-----------+---+  !Recovery 5 !72!120/80 (93)!95%!  +-----------------------------+--+-----------+---+  Gated SPECT: The calculated left ventricular ejection fraction is 70%.  Myocardial Perfusion Imaging: The TID ratio is 1.13. There is a small,  mild, fixed defect involving the basal inferior wall(s). LV myocardial  perfusion is otherwise normal. Wall motion normal.  Study completion: There were no complications.    ----------------------------------------------------------------------------  Impressions:    - Normal nuclear perfusion study.  - There is no evidence of myocardial ischemia.  Prepared and signed by  Cadence Lao MD  04/17/2024 18:08    ASSESSMENT AND PLAN:  Diagnoses and all orders for this visit:    Preoperative general physical examination, Risk assessment  I have reviewed patient's Medical History, Problems, Medications.  I have examined patient  I have reviewed results of Laboratory testing and Electrocardiogram, as well as the results of nuclear medicine cardiac stress test, Myoview Lexiscan cardiac stress test results, the patient has completed less than 1 year ago, it was normal.  Patient is medically stable and is at Moderate cardiovascular risk for upcomming surgery (Based on ACS/AHA Guidelines). However, given patient's poor functional status and chronic hypoxemic respiratory failure, I am more concerned about patient's pulmonary status prior to  upcoming surgery. Patient should get additional evaluation and clearance from pulmonology services prior to surgery  - Final decision regarding the preoperative risk for anesthesia should be made by anesthesiologist prior to planned surgery. Please contact patient's pulmonologist and cardiologist for additional questions, concerns and clarification  - Blood pressure is adequately controlled.  -Patient does not have any chest pain, palpitations or dyspnea at the time of the office visit  - Exercise capacity: over 4 METs  - Denies History of Kidney Disease  - Denies History of Excessive Bleeding  - Denies History of excessive clotting  - Reviewed and discussed recent laboratory testing and other test results with patient  - Reviewed recent clinical notes  - Recorded, reviewed and discussed ECG with patient: Normal Sinus rhythm, Rate 60 BPM, no significant cardiac arrhythmias, no cardiac blocks, no evidence for ST segment changes or T wave abnormalities  - patient was instructed to stop using NSAIDs 5 days prior to surgery  - XR CHEST PA + LAT CHEST (CPT=71046); Future  - CBC WITH DIFFERENTIAL WITH PLATELET; Future  - COMPREHENSIVE METABOLIC PANEL; Future  - URINALYSIS, COMPLETE WITH MICROSCOPIC EXAMINATION WITH REFLEX TO URINE CULTURE, ROUTINE; Future  - ECG ROUTINE ECG W/LEAST 12 LDS W/I&R  - MRSA / MSSA PRE-OP; Future  - PROTHROMBIN TIME (PT) AND PARTIAL THROMBOPLASTIN TIME (PTT); Future  - PULMONARY EVAL & TREAT (DMG); Future    Essential hypertension  - Blood pressure is adequately controlled, average blood pressure at home is less than 140/90 mm Hg  - Denies chest pain or palpitations, denies dyspnea on exertion or leg edema  - Blood pressure is adequately controlled.  - I recommended to reduce salt consumption to 4 grams daily, routine exercise (at least 30 minutes 4 times a week), maintain healthy body weight, limit NSAID use.  -Recommend home blood pressure monitoring. Goal blood pressure readings should be  under 140/90 90% of the time.  - Contact our clinic if Blood Pressure becomes uncontrolled prior to next follow up visit.  - Contact our clinic if patient develops chest pain, palpitations, dyspnea prior to next follow up visit.  - Ordered follow-up laboratory testing  - Reviewed and discussed recent laboratory testing and ECG test results with patient  - Reviewed recent clinical notes  - CBC WITH DIFFERENTIAL WITH PLATELET; Future  - COMPREHENSIVE METABOLIC PANEL; Future  - LIPID PANEL; Future  - TSH+FREE T4; Future  - URINALYSIS /REFLEX TO MICROSCOPIC; Future  - OFFICE/OUTPT VISIT,EST,LEVL IV  - Irbesartan 300 MG Oral Tab; Take 1 tablet (300 mg total) by mouth daily.  - atorvastatin 10 MG Oral Tab; Take 1 tablet (10 mg total) by mouth daily.  - CBC WITH DIFFERENTIAL WITH PLATELET; Future  - COMPREHENSIVE METABOLIC PANEL; Future  - URINALYSIS, COMPLETE WITH MICROSCOPIC EXAMINATION WITH REFLEX TO URINE CULTURE, ROUTINE; Future    Chronic bilateral low back pain with sciatica, sciatica laterality unspecified  Bilateral sciatica  Bulging lumbar disc  Patient has chronic Lower back pain and Sciatica  Patient states that his symptoms are getting worse for the last 7 months  Patient has intermittent sciatica, both sides, more bothersome to the right side  The patient reports constant pain, with no pain-free periods since the onset of symptoms. The pain is primarily located in the right hamstring and both calves, but it does shift to other areas. The patient reports that the pain is exacerbated by walking and is severe enough to cause discomfort even when sitting.  Patient denies having weakness or paresthesias in lower extremities. Denies having loss of bowel bladder function. Denies having saddle anesthesia  Patient denies having significant lower back traumas or injuries  Patient had MRI of Lumbar spine, it revealed Spondylolisthesis at L5 with severe compression of right and left L5 nerve roots. Large disc herniation  at L3-4  Patient was seen and evaluated by Orthopedic Spine surgeon, Dr. Spencer Vergara, spinal surgery was recommended (decompression and fusion at L5-s1 to the Pelvis and lami/ discectomy at L3-4 )  - CBC WITH DIFFERENTIAL WITH PLATELET; Future  - COMPREHENSIVE METABOLIC PANEL; Future  - URINALYSIS, COMPLETE WITH MICROSCOPIC EXAMINATION WITH REFLEX TO URINE CULTURE, ROUTINE; Future    Dyspnea on exertion  Idiopathic pulmonary fibrosis (HCC)  Chronic hypoxemic respiratory failure (HCC)  - Denies shortness of breath at rest, but patient has dyspnea on exertion,, cough, reduced exercise tolerance  Denies abdominal pain, nausea, vomiting, diarrhea or constipation  Denies headaches, dizziness or lightheadedness  Denies dysuria, urinary frequency or urgency  Patient is barely physically active, patient's exercise capacity is less than 4 METs due to Interstitial Pulmonary fibrosis, chronic hypoxemic respiratory failure  - I refer patient to pulmonologist for additional evaluation and treatment  -Given patient's poor functional status, patient is at least at moderate risk for upcoming surgery. Further recommendations should be provided by pulmonologist  Patient is medically stable and is at Moderate cardiovascular risk for upcomming surgery (Based on ACS/AHA Guidelines). However, given patient's poor functional status and chronic hypoxemic respiratory failure, I am more concerned about patient's pulmonary status prior to upcoming surgery. Patient should get additional evaluation and clearance from pulmonology services prior to surgery  - Final decision regarding the preoperative risk for anesthesia should be made by anesthesiologist prior to planned surgery. Please contact patient's pulmonologist and cardiologist for additional questions, concerns and clarification  - Albuterol Sulfate  (90 Base) MCG/ACT Inhalation Aero Solution inhale 2 puffs into the lungs every 4 (four) hours as needed for Wheezing.  - XR CHEST PA  + LAT CHEST (CPT=71046); Future  - CBC WITH DIFFERENTIAL WITH PLATELET; Future  - COMPREHENSIVE METABOLIC PANEL; Future  - URINALYSIS, COMPLETE WITH MICROSCOPIC EXAMINATION WITH REFLEX TO URINE CULTURE, ROUTINE; Future  - Pulmonology, follow-up and treat    Atherosclerosis of aorta (HCC)  - Blood pressure is adequately controlled, average blood pressure at home is less than 140/90 mm Hg  - Denies chest pain or palpitations, denies dyspnea on exertion or leg edema  - Blood pressure is adequately controlled.  - I recommended to reduce salt consumption to 4 grams daily, routine exercise (at least 30 minutes 4 times a week), maintain healthy body weight, limit NSAID use.  -Recommend home blood pressure monitoring. Goal blood pressure readings should be under 140/90 90% of the time.  - Contact our clinic if Blood Pressure becomes uncontrolled prior to next follow up visit.  - Contact our clinic if patient develops chest pain, palpitations, dyspnea prior to next follow up visit.  - Ordered follow-up laboratory testing  - Reviewed and discussed recent laboratory testing and ECG test results with patient  - Reviewed recent clinical notes  - CBC WITH DIFFERENTIAL WITH PLATELET; Future  - COMPREHENSIVE METABOLIC PANEL; Future  - LIPID PANEL; Future  - TSH+FREE T4; Future  - URINALYSIS /REFLEX TO MICROSCOPIC; Future  - OFFICE/OUTPT VISIT,EST,LEVL IV  - Irbesartan 300 MG Oral Tab; Take 1 tablet (300 mg total) by mouth daily.  - atorvastatin 10 MG Oral Tab; Take 1 tablet (10 mg total) by mouth daily.  - CBC WITH DIFFERENTIAL WITH PLATELET; Future  - COMPREHENSIVE METABOLIC PANEL; Future    Class 2 severe obesity with body mass index (BMI) of 35 to 39.9 with serious comorbidity (HCC)  - Preventative Diet and exercise related life-style changes discussed and recommended  - Weight goals reviewed and discussed  - Recommended that patient should follow low fat and low carbohydrate diet, consider exercise and weitght management  program  - Focus on the need for portion control and balanced diet. Increase fruits and vegetables and good lean proteins. Moderation is key.  - We will recheck and discuss body weight next office visit  - Ordered follow-up laboratory testing  - Reviewed and discussed recent laboratory testing and other test results with patient  - Reviewed recent clinical notes  - CBC WITH DIFFERENTIAL WITH PLATELET; Future  - COMPREHENSIVE METABOLIC PANEL; Future        The patient indicates understanding of these issues and agrees to the plan.  Reviewed and discussed recent laboratory testing and other test results with patient  Reviewed recent clinical notes  Proper usage and side effects of medications reviewed and discussed  Recommended that patient should follow healthy diet, consider exercise and weitght loss program  Medical compliance with plan discussed and risks of non-compliance reviewed  Recommended prompt follow-up if symptoms remain uncontrolled or worsened  Patient education completed on disease process, etiology, and prognosis  Return to the clinic as clinically indicated as discussed with patiPeri-incisional/redness despite discussion    Additionally requesting emergency roomMedically umbilical phimosis. Border pressure is moderately neurological, butClinical undergarment was intact present, located both interestedDescription ofPercutaneousPatient seen and evaluatedHyperkalemiaIrritatedent who verbalized understanding of and agreement with the plan    Erika was comfortable with our evaluation today, verbalized understanding and agreement with recommendations.    This note was prepared using Dragon Medical voice recognition dictation software. As a result errors may occur. When identified these errors have been corrected. While every attempt is made to correct errors during dictation discrepancies may still exist      Electronically signed by Rocky Torres MD at 03/31/2025 10:57 AM CDT

## 2025-04-21 NOTE — ANESTHESIA PROCEDURE NOTES
Arterial Line    Date/Time: 4/21/2025 12:13 PM    Performed by: Dev Gunn MD  Authorized by: Dev Gunn MD    General Information and Staff    Procedure Start:  4/21/2025 12:13 PM  Procedure End:  4/21/2025 12:13 PM  Anesthesiologist:  Dev Gunn MD  Performed By:  Anesthesiologist  Patient Location:  OR  Indication: continuous blood pressure monitoring and blood sampling needed    Site Identification: surface landmarks    Preanesthetic Checklist: 2 patient identifiers, IV checked, risks and benefits discussed, monitors and equipment checked, pre-op evaluation, timeout performed, anesthesia consent and sterile technique used    Procedure Details    Catheter Size:  20 G  Catheter Length:  1 and 3/4 inch  Catheter Type:  Arrow  Seldinger Technique?: Yes    Laterality:  Right  Site:  Radial artery  Site Prep: chlorhexidine    Line Secured:  Tape and Tegaderm    Assessment    Events: patient tolerated procedure well with no complications      Medications  4/21/2025 12:13 PM      Additional Comments

## 2025-04-21 NOTE — DISCHARGE INSTRUCTIONS
Sometimes managing your health at home requires assistance.  The [facility] team has recognized your preference to use [facility].  They can be reached by phone at [phone].  The fax number for your reference is [phone].  A representative from the home health agency will contact you or your family to schedule your first visit.        Home Care Instructions  Lumbar Spinal Fusion  Spencer Vergara MD  [facility] Telephone Number: [phone]     Activity  Mobility  Walking is highly encouraged throughout the day, as tolerated.   Smaller distances are more easily tolerated.  Your goal is to increase the distance you walk each day.  Remember to listen to your body.  Exercise caution in adverse weather or terrain conditions.    Stairs  You may ascend and descend stairs as you tolerate.   Be safe and deliberate. Hold the handrail and advance one step at a time.  Avoid step-stools and ladders.     Restrictions  No twisting, pulling, pushing, or lifting items with a force greater than 10 pounds. (~Gallon of Milk)  No lifting objects above the level of your shoulders.  No bending with your back - you may bend at the knees and hips, maintaining a straight back.  Typically, your restrictions will be lessened at your two-week follow-up appointment, however we appreciate your compliance with restrictions until directed otherwise.   If you have been provided a walking aid such as a walker or cane, please use as directed.    Sitting  Fatigue is common after surgery and you may find respite in sitting. This is normal and encouraged. Please remember to be deliberately mobile throughout the day. Change your position frequently, as your muscles may get sore and stiff without movement.    It is recommended to sit in a chair which allows your knees to be at about the same level as your hips. This makes rising from a seated position more feasible.   Avoid overstuffed or plush chairs. Medium to firm  chairs with straight backs give better support.   Recliners are OK but you may need to add pillows to avoid sinking into the chair.  Use a raised toilet seat if needed.  Change position every 20-30 minutes throughout the day (example: after sitting, stand, then you can sit again for another 20-30 minutes).    Sexual Activity  You may resume presurgical sexual activity two weeks following surgery as tolerated and when approved by your surgeon.  Discontinue sexual activity if it causes or exacerbates your pain.     Exercise/Fitness Activity  Do not participate in this activity during the two weeks following surgery, unless instructed otherwise.  Your surgeon will lessen restrictions and progress your activity level when appropriate.     Driving  You may NOT drive while taking narcotics or muscle relaxants.  Back and leg pain have been shown to decrease breaking reaction time, particularly after surgery, however, there is no specific time to return to driving.   When you feel able and safe to drive you may return to driving. Slowly advance the complexity of your driving from short distance driving on local streets and in parking lots to longer distance driving including highway driving.     Travel  Avoid air travel and long-distance automobile travel the first two weeks following surgery.     Bracing/Corset  You may not require an immobilizer or brace for your back unless your surgeon has indicated otherwise.   If you have been provided a brace or corset  Use when out of bed except when showering.  You may wear even when toileting.  Anticipate wearing for 6-12 weeks after surgery; exact time to be determined by surgeon.   Apply/remove your brace when sitting/standing at side of your bed.     Sleeping  You will require plenty of rest and sleep after surgery.   Use the position that provides you the most comfort.  You may use a pillow under knees, between legs, or behind back (if lying on your side), for comfort.   Log  roll to turn and rise from a recumbent position.   You may have difficulty sleeping at night. This is not abnormal. If you experience this, try to avoid napping during the day.   It is common to fatigue easily after surgery, this will typically improve one month after your surgery.   Maintain clean sheets and pillow cases.   Rub, with a clean towel to dry.     Return to work  When cleared by surgeon. Discuss specific work activities with your surgeon at your follow-up visit.  Light/sedentary type work may be possible 2 weeks post-op.  Most work activities are permitted approximately 6-12weeks after surgery.     Dressing and Wound Care  You will have a water-resistant, clear, adhesive dressing over your wound. Please leave this dressing in place for 3 days after surgery.   Remove dressing 3 days after surgery and inspect your wound. You will find glue and mesh tape over your wound, please leave these intact. If your wound appears dry without fresh drainage you would not have to cover up the area with any additional dressing. Leave it open to air. If your wound continues to have fresh drainage, place a fresh gauze and paper tape changing daily until your first post-operative appointment or until there is no longer any active drainage. You may leave your wound open to air or cover with gauze and paper tape after showers.   Always wash hands before and after dressing changes.   Watch incision for any worsening redness, increased drainage, increased warmth or opening of the incision.  Call your surgeon’s office/answering service if you notice any of these.  Do not apply lotions or ointments on or near incision.    Bathing  You may shower over the adhesive dressing (Tegaderm) that is in place starting the 3rd day after surgery.  Remove the dressing after this first shower.  Do not submerge your wound. No tub bathing, swimming, use of steam rooms or saunas for 6 weeks following surgery and when permitted by your  surgeon.  If using gauze dressings, they must be removed before showering.  Do not scrub your incision site however you may allow soapy water to run over the site.   Dab the site dry with a clean towel.  You may leave your wound open to air or cover with gauze and paper tape after showers. If there is still drainage, do cover the wound with gauze dressing.    Diet and Constipation Prevention  Your appetite may be poor. Your desire for food will return.  Drink plenty of liquids (water, juice) each day to prevent dehydration.  Maintain a healthy, well balanced diet that includes plenty of protein following surgery.  Foods that are high in fiber (bran, vegetables, fruit) are good ways to prevent constipation.   Use an over the counter stool softener while taking narcotics to prevent constipation; the use of medications such a Miralax or Milk of Magnesia may be needed.  An enema or glycerin suppository may be necessary if above measures do not work.  Contact your pharmacist, family doctor, or surgeon for advice.    No Smoking  Smoking will inhibit healing.  Smoking has been clearly shown to inhibit solid bony fusion and is counterproductive to the nature of your surgery.   Even one cigarette a day may cause problems.  Vaping, chewing tobacco, nicotine gum and patches will also inhibit healing.    Pain Management  Expectations  You will have incisional site pain. This is normal and expected after surgery.   You may continue to have leg symptoms which should improve over time.   It is not uncommon 48-72 hours after surgery for patients to experience worsening pain symptoms or return of leg pain/symptoms as post-surgical inflammation sets in. Do not be alarmed as this should gradually improve.     Non-medication Based Techniques  Relaxation techniques  A way to focus your attention other than on your pain. You are innately capable of producing endorphins, a naturally occurring hormone, that can decrease pain. Some examples  of relaxation techniques which may result in a release of endorphins include deep breathing, listening to music, prayer, or meditation.   The use of cold therapy for 20 minutes multiple times per day is encouraged.  You may apply heat to areas of muscle spasm only. Do not apply heat directly over your wound as this can lead to swelling and increased pain.   Gentle stretching may ease muscle spasm.  The idea is to “lengthen” the muscle that is in spasm. Avoid any aggressive bending, lifting, twisting with your neck or back. Gently bending and stretching is OK.  Gentle massage applied to the muscle spasm may help reduce discomfort.    Medication  You may be prescribed a specific NSAID by your surgeon unless contraindicated. Please take as directed as this will help with pain control and inflammation.  Discontinue use of presurgical NSAIDs or any new non-prescribed NSAIDs for 3 months (non-steroidal anti-inflammatories) which include: Aspirin, Motrin, Advil, Aleve, Naprosyn, Voltaren, Mobic, Indocin, Meloxicam, Ibuprofen, Indomethacin, Naproxen, Diclofenac. (COMPLETE LIST IN GUIDEBOOK)  Tylenol (acetaminophen) is another excellent medication for pain. Take caution as most narcotics contain acetaminophen. You may take a combined daily maximum 4 grams (4000mg) of Tylenol (acetaminophen) in 24 hours. More than this can lead to liver injury.   You will be prescribed a narcotic medication. Take this as needed for breakthrough pain. Gradually wean yourself from prescription medication. You may substitute for Tylenol (acetaminophen).  Consider taking pain medication 30 minutes prior to activity to avoid incisional pain.   Take muscle relaxants as needed only if experiencing muscle spasm.  Please allow medications 30-45 minutes to take effect.  Do NOT drink alcohol while on pain medication.  Monitor need for pain medication refills closely.   Call your pharmacy or physician’s office at least five days before you run out of  pain medication. If calling physician office after 3 pm, requests will be addressed on the next business day. Calls for refills on Fridays, holidays, and weekends may result in a processing delay of your refill until the next business day.     Post-op Office Visit  Patients are routinely seen 2 weeks, 6 weeks, 3 months, 6 months, and 1 year after surgery.   You will be scheduled for a post-surgical visit two weeks after surgery. Please confirm this appointment.  It is very important that you keep this appointment.  We recommend making a list of questions to bring with you as it is not uncommon for patients to forget questions while being seen.  Schedule a one week follow up with your Primary Care Physician. It is a good opportunity to review all your medications including any new additions we may provide.     When to contact your surgeon’s team:  Temperature > 101.5°F or 38.5°C.   Increasing pain, swelling, redness, odor, or drainage from your incision.  Separation of incision.  Sudden reappearance of pain that won’t go away with pain medication.  New numbness or weakness to arms or legs.  Difficulty urinating or if incontinence of your bowel.   Headaches that worsen when standing/sitting and resolve when laid flat.  Any concerns, unanswered questions, or new problems.     Go directly to the ER or CALL 911 if you:  become short of breath  have chest pain  cough up blood  have unexplained anxiety with breathing  It was recommended that you use a hospital bed at home to facilitate your recovery and compliment your treatment.  The [facility] team has set up delivery with [facility].  Contact information: Alona Tapia [address] [address].  Phone: [phone]..  The estimated delivery is tomorrow.

## 2025-04-22 PROCEDURE — 97116 GAIT TRAINING THERAPY: CPT

## 2025-04-22 PROCEDURE — 97165 OT EVAL LOW COMPLEX 30 MIN: CPT

## 2025-04-22 PROCEDURE — 97530 THERAPEUTIC ACTIVITIES: CPT

## 2025-04-22 PROCEDURE — 97161 PT EVAL LOW COMPLEX 20 MIN: CPT

## 2025-04-22 RX ORDER — ACETAMINOPHEN 325 MG/1
650 TABLET ORAL EVERY 6 HOURS PRN
Status: DISCONTINUED | OUTPATIENT
Start: 2025-04-22 | End: 2025-04-23

## 2025-04-22 RX ORDER — METOPROLOL SUCCINATE 25 MG/1
25 TABLET, EXTENDED RELEASE ORAL
Status: DISCONTINUED | OUTPATIENT
Start: 2025-04-22 | End: 2025-04-23

## 2025-04-22 RX ORDER — PIRFENIDONE 267 MG/1
801 TABLET, FILM COATED ORAL 3 TIMES DAILY
Status: DISCONTINUED | OUTPATIENT
Start: 2025-04-22 | End: 2025-04-23

## 2025-04-22 RX ORDER — PIRFENIDONE 267 MG/1
3 CAPSULE ORAL 3 TIMES DAILY
Status: DISCONTINUED | OUTPATIENT
Start: 2025-04-22 | End: 2025-04-22 | Stop reason: SDUPTHER

## 2025-04-22 RX ORDER — PREGABALIN 50 MG/1
50 CAPSULE ORAL 2 TIMES DAILY
Status: DISCONTINUED | OUTPATIENT
Start: 2025-04-22 | End: 2025-04-23

## 2025-04-22 RX ORDER — DEXAMETHASONE SODIUM PHOSPHATE 10 MG/ML
10 INJECTION, SOLUTION INTRAMUSCULAR; INTRAVENOUS EVERY 8 HOURS
Status: COMPLETED | OUTPATIENT
Start: 2025-04-22 | End: 2025-04-23

## 2025-04-22 RX ORDER — ATORVASTATIN CALCIUM 10 MG/1
10 TABLET, FILM COATED ORAL NIGHTLY
Status: DISCONTINUED | OUTPATIENT
Start: 2025-04-22 | End: 2025-04-23

## 2025-04-22 NOTE — PROGRESS NOTES
PT states they want to use nasal cannula instead at night for now and keep it on stand-by. (Setting +7)

## 2025-04-22 NOTE — CONSULTS
General Medicine Consult      Consulted by: Spencer Vergara MD    PCP: Rocky Torres MD    Reason for Consultation: Medical Management    History of Present Illness: Patient is a 74 year old male with PMH including but not limited to htn, hl, yaz, pf ON 3L at times, CPAP at night for YAZ who p/t EH for scheduled spine surgery by Spencer Marti MD.   Pt saw Rocky Torres MD 3/21 and I reviewed the note. Pt denies any significant history of cardiac conditions. During day if does something, uses o2.  No tob, glass wine nightly, haven't had x1wk.       PMH:  Past Medical History:   Cancer (HCC)    BASAL CELL MELANOMA ON BACK    Diabetes (HCC)    PT DENIES, ONLY PREDIABETIC    Hearing impaired person, bilateral    HEARING AIDS    High blood pressure    High cholesterol    Hypertension    Kidney disease    YAZ (obstructive sleep apnea)    AHI-12    Osteoarthritis    Prediabetes    Pulmonary fibrosis (HCC)    Shortness of breath    3L OXYGEN AT HOME AS NEEDED PER DAY    Visual impairment    glasses        PSH:  Past Surgical History[1]     HOME MEDS  Scheduled Meds w/Route[2]    ALL:  Allergies[3]     Soc Hx:  Social History     Tobacco Use    Smoking status: Never    Smokeless tobacco: Never   Substance Use Topics    Alcohol use: Yes        Fam Hx  Family History[4]    Review of Systems  Comprehensive ROS reviewed and negative except for what's stated above.  Including negative for chest pain, shortness of breath, syncope.       OBJECTIVE:  /63 (BP Location: Left arm)   Pulse 65   Temp 97.8 °F (36.6 °C) (Oral)   Resp 18   Ht 5' 11\" (1.803 m)   Wt 236 lb 6.4 oz (107.2 kg)   SpO2 96%   BMI 32.97 kg/m²     General:  Alert, no distress, appears stated age.     Head:  Normocephalic, without obvious abnormality, atraumatic.   Eyes:  Sclera anicteric, EOMs intact.    Nose: Nares normal,  Mucosa normal    Throat: Lips normal   Neck: Supple, symmetrical, trachea midline   Lungs:   Clear to auscultation  bilaterally. Normal effort   Chest wall:  No tenderness or deformity   Heart:  Regular rate and rhythm, S1, S2 normal, no murmur, rub or gallop appreciated   Abdomen:   Soft, NT/ND, Bowel sounds normal. No masses,  No organomegaly.    Extremities: Extremities normal, atraumatic, no cyanosis or LE edema.   Skin: Skin color, texture, turgor normal. No rashes or lesions.    Neurologic: Moving all extremities spontaneously, no focal deficit appreciated     Diagnostics:   CBC/Chem  No results for input(s): \"WBC\", \"HGB\", \"MCV\", \"PLT\", \"BAND\", \"INR\" in the last 168 hours.    Invalid input(s): \"LYM#\", \"MONO#\", \"BASOS#\", \"EOSIN#\"    No results for input(s): \"NA\", \"K\", \"CL\", \"CO2\", \"BUN\", \"CREATSERUM\", \"GLU\", \"CA\", \"CAION\", \"MG\", \"PHOS\" in the last 168 hours.    No results for input(s): \"ALT\", \"AST\", \"ALB\", \"AMYLASE\", \"LIPASE\", \"LDH\" in the last 168 hours.    Invalid input(s): \"ALPHOS\", \"TBIL\", \"DBIL\", \"TPROT\"      Radiology: XR FLUOROSCOPY C-ARM TIME LESS THAN 1 HOUR (CPT=76000)  Result Date: 4/21/2025  PROCEDURE:  XR FLUOROSCOPY C-ARM TIME <1 HOUR  (CPT=76000)  INDICATIONS:  Intraoperative fluoroscopy.  COMPARISON:  None.   TECHNIQUE:  Intraoperative fluoroscopy.  FLUOROSCOPY IMAGES OBTAINED:  11 images FLUOROSCOPY TIME:  2 minutes 28 seconds TECHNOLOGIST TIME:  3 hours RADIATION DOSE (AIR KERMA PRODUCT):  254.58mGy   FINDINGS:  Intraoperative fluoroscopic images from spinal surgery.            CONCLUSION:  Please see the procedure/operative report for further details.    LOCATION:  [facility]    Dictated by (CST): Stromberg, LeRoy, MD on 4/21/2025 at 4:02 PM     Finalized by (CST): Stromberg, LeRoy, MD on 4/21/2025 at 4:02 PM            ASSESSMENT / PLAN:    74 year old male with PMH including but not limited to htn, hl, yaz, pf ON 3L at times, CPAP at night for YAZ who p/t EH for scheduled spine surgery by Dr. Vergara,       # SPONDYLOISTHESIS; SPINAL STENOSIS    -S/P BILATERAL LUMBAR 5 - SACRAL 1 TRANSFORAMINAL LUMBAR  INTERBODY FUSION, BILATERAL PELVIC SCREWS, LUMBAR 3 - LUMBAR 4 LAMINECTOMY   -cont plan per ortho.  Continue PT/OT, increase activity as tolerated   -IS 10x/hr for atelectasis; Instructed on use of IS   -PO food/fluid as janet, bowel regimen  -crow/post-op abx per surgery    # Acute Post-op pain   -Currently on IV pain meds, will monitor and encourage transition to PO pain meds as tolerated  -Bowel regimen for associated narcotic constipation      # HTN   - BB    # ANDIE  -protocol    # PF  - on 3L  - pirfenidone    # DVT proph  -SCDs, hold anticoagulation/NSAIDs given spine surgery      Dispo: cont ortho post-op care    Outpatient records records reviewed including Rocky Torres MD note       D/w RN       Thank you for allowing me to participate in the care of this patient.     Arsh Starks MD  [facility]  Pager [phone]    4/21/2025  9:17 PM         [1]   Past Surgical History:  Procedure Laterality Date    Appendectomy      [facility] acl esther maintenance      Other surgical history      WALTER bunionectomy    Tonsillectomy     [2] sennosides, 17.2 mg, Oral, Nightly  docusate sodium, 100 mg, Oral, BID  vancomycin, 15 mg/kg, Intravenous, Once  [3] Not on File  [4]   Family History  Problem Relation Age of Onset    Cancer Father     Heart Disorder Mother

## 2025-04-22 NOTE — PLAN OF CARE
A&Ox4. VSS. On 2L O2 via NC. /IS. Hx ANDIE - telemetry monitoring. Teds/SCDs. Ankle pumps encouraged. Tolerating diet. Last BM 4/20. Diehl in place postop. Surgical dressing to back C/D/I, gel ice in place. Plan is for PT/OT eval. Patient updated and in agreement with plan of care. Safety precautions in place. Instructed patient to call for assistance, call light within reach.

## 2025-04-22 NOTE — PROGRESS NOTES
S:  back pain increased. Increased BLE (s1) pain. Burning and stabbing pain.     Inspection:  Awake alert No acute distress. No difficulty breathing     Blood pressure 118/66, pulse 72, temperature 98.9 °F (37.2 °C), temperature source Oral, resp. rate 16, height 5' 11\" (1.803 m), weight 236 lb 6.4 oz (107.2 kg), SpO2 96%.    No results for input(s): \"RBC\", \"HGB\", \"HCT\", \"MCV\", \"MCH\", \"MCHC\", \"RDW\", \"NEPRELIM\", \"WBC\", \"PLT\" in the last 168 hours.    Lumbar/Sacral Integument: Incision/ incisions;  Dressing in place, good wound approximation no erythema or fluctuance    Strength: Strength of bilateral lower extremities: Weakness L5 gluteus bilateral   Sensation: No sensory deficits noted on bilateral lower extremities      HEAD/NECK: Head is normocephalic  EYES: EOMI, ISA  SKIN EXAM: Skin is intact, head, neck, trunk and arms/legs. No rashes, mottling or ulcerations.  LYMPH EXAM: There is no lymph edema in either lower extremity.  VASCULAR EXAM:  Good distal perfusion. No clubbing or cyanosis.Calves supple, NT bilaterally  ABDOMINAL EXAM: Abdomen is soft, NT.      A/P: POD # 1 s/p BILATERAL LUMBAR 5 - SACRAL 1 TRANSFORAMINAL LUMBAR INTERBODY FUSION, BILATERAL PELVIC SCREWS, LUMBAR 3 - LUMBAR 4 LAMINECTOMY     P: add lyrica 50mg bid and decadron 10mg IV q8hrs 3 doses. Continue pain management, Continue PT/OT, Discharge planning: ok to d/c if cleared by other services , Continue medical management

## 2025-04-22 NOTE — CM/SW NOTE
Department  notified of request for HHC, aidin referrals started. Assigned CM/SW to follow up with pt/family on further discharge planning.     Rosanne Peter, DSC

## 2025-04-22 NOTE — PROGRESS NOTES
[facility] Hospitalist Progress Note     PCP: Rocky Torres MD    Chief Complaint: follow-up   Follow up for: The primary encounter diagnosis was Atypical nevus. Diagnoses of Medicare annual wellness visit, subsequent, Bunion, Screening cholesterol level, Obesity (BMI 35.0-39.9 without comorbidity), Essential hypertension, Screening for metabolic disorder, Impaired fasting blood sugar, Screening for cardiovascular condition, Preventative health care, Primary osteoarthritis of both knees, Need for immunization against influenza, Pain in both feet, Presbycusis of both ears, Chronic pain of both knees, and Spondylolisthesis at L5-S1 level were also pertinent to this visit.    Overnight/Interim Events:      SUBJECTIVE:  Feels lousy. Pain. Couldn't find good position. Breathing settling down now, was worse c exertion. No cp. Moved from bed to chair. Weakness R leg. On 3-4L. Diehl. No flatus/bm yet.     OBJECTIVE:  Temp:  [97.4 °F (36.3 °C)-98.9 °F (37.2 °C)] 98.9 °F (37.2 °C)  Pulse:  [] 72  Resp:  [16-24] 16  BP: ()/(55-76) 118/66  SpO2:  [96 %-100 %] 96 %    Intake/Output:    Intake/Output Summary (Last 24 hours) at 4/22/2025 1059  Last data filed at 4/22/2025 0900  Gross per 24 hour   Intake 2870 ml   Output 1700 ml   Net 1170 ml       Last 3 Weights   04/21/25 0835 236 lb 6.4 oz (107.2 kg)   03/27/25 0900 255 lb (115.7 kg)   10/13/21 1258 267 lb 6.4 oz (121.3 kg)   10/05/21 1316 255 lb (115.7 kg)       Exam    General: Alert, no distress, appears stated age.     Head:  Normocephalic, without obvious abnormality, atraumatic.   Eyes:  Sclera anicteric, EOMs intact.    Nose: Nares normal,  Mucosa normal    Throat: Lips normal   Neck: Supple, symmetrical, trachea midline   Lungs:   Clear to auscultation bilaterally. Normal effort   Chest wall:  No tenderness or deformity   Heart:  Regular rate and rhythm, S1, S2 normal, no murmur, rub or gallop appreciated   Abdomen:   Soft, NT/ND, Bowel sounds normal. No  masses,  No organomegaly.    Extremities: Extremities normal, atraumatic, no cyanosis or LE edema.   Skin: Skin color, texture, turgor normal. No rashes or lesions.    Neurologic: Moving all extremities spontaneously, no focal deficit appreciated      Data Review:       Labs:     No results for input(s): \"WBC\", \"HGB\", \"MCV\", \"PLT\", \"BAND\", \"INR\" in the last 168 hours.    Invalid input(s): \"LYM#\", \"MONO#\", \"BASOS#\", \"EOSIN#\"    No results for input(s): \"NA\", \"K\", \"CL\", \"CO2\", \"BUN\", \"CREATSERUM\", \"CA\", \"CAION\", \"MG\", \"PHOS\", \"GLU\" in the last 168 hours.    No results for input(s): \"ALT\", \"AST\", \"ALB\", \"AMYLASE\", \"LIPASE\", \"LDH\" in the last 168 hours.    Invalid input(s): \"ALPHOS\", \"TBIL\", \"DBIL\", \"TPROT\"    No results for input(s): \"PGLU\" in the last 168 hours.    No results for input(s): \"TROP\" in the last 168 hours.      Meds:     Scheduled Medications[1]  Medication Infusions[2]  PRN Medications[3]       Assessment/Plan:     74 year old male with PMH including but not limited to htn, hl, yaz, pf ON 3L at times, CPAP at night for YAZ who p/t EH for scheduled spine surgery by Dr. Vergara,         # SPONDYLOISTHESIS; SPINAL STENOSIS    -S/P BILATERAL LUMBAR 5 - SACRAL 1 TRANSFORAMINAL LUMBAR INTERBODY FUSION, BILATERAL PELVIC SCREWS, LUMBAR 3 - LUMBAR 4 LAMINECTOMY   -cont plan per ortho.  Continue PT/OT, increase activity as tolerated   -IS 10x/hr for atelectasis; Instructed on use of IS   -PO food/fluid as janet, bowel regimen  -crow/post-op abx per surgery     # Acute Post-op pain   -Currently on IV pain meds, will monitor and encourage transition to PO pain meds as tolerated  -Bowel regimen for associated narcotic constipation        # HTN  - resume bb  - SBP 80-100s yesterday, now 110-130s  - hold arb for renal protection     # YAZ  -protocol     # PF  - on 3L  - pirfenidone     # DVT proph  -SCDs, hold anticoagulation/NSAIDs given spine surgery        Dispo: cont ortho post-op care, cont PT          Questions/concerns were discussed with patient by bedside. D/w RN     Total Time spent with patient and coordinating care:  55 minutes. [facility] hospitalist to resume care in AM, will discuss plan with them      Arsh Starks MD  [facility] Hospitalist  [phone]  4/22/2025  10:59 AM             [1]    sennosides  17.2 mg Oral Nightly    docusate sodium  100 mg Oral BID   [2]    lactated ringers Stopped (04/21/25 2868)   [3]   sodium chloride    polyethylene glycol (PEG 3350)    magnesium hydroxide    bisacodyl    fleet enema    ondansetron    metoclopramide    diphenhydrAMINE **OR** diphenhydrAMINE    benzocaine-menthol    oxyCODONE **OR** oxyCODONE    HYDROmorphone **OR** HYDROmorphone    cyclobenzaprine    diazePAM

## 2025-04-22 NOTE — CM/SW NOTE
04/22/25 1400   CM/SW Referral Data   Referral Source Social Work (self-referral)   Reason for Referral Discharge planning   Informant EMR;Clinical Staff Member   Discharge Needs   Anticipated D/C needs Home health care     HOME SITUATION per PT eval  Type of Home: House  Home Layout: Two level (able to stay main floor if absolutely needed)         Stairs to Bedroom: 14    Railing: Yes    Lives With: Spouse    Drives: Yes   Patient Regularly Uses: Glasses       Prior Level of [place] per PT eval: Pt typically indep with ADLs and mobility, increased difficulty recently.        Patient is a 73 y/o man admitted s/p TLIF. Informed by therapy that pt would benefit from [facility] services at [place]. Referral sent to  agencies via [facility] by DSC. Await responses for further [place] planning. / to remain available for support and/or discharge planning.     Karlie Ontiveros, [facility]  Discharge Planner  [phone]

## 2025-04-22 NOTE — OCCUPATIONAL THERAPY NOTE
OCCUPATIONAL THERAPY EVALUATION - INPATIENT     Room Number: 360/360-A  Evaluation Date: 4/22/2025  Type of Evaluation: Initial  Presenting Problem: s/p L5-S1 TLIF 4/21/25    Physician Order: IP Consult to Occupational Therapy  Reason for Therapy: ADL/IADL Dysfunction and Discharge Planning    OCCUPATIONAL THERAPY ASSESSMENT   Patient is currently functioning near baseline with standing/LB ADLs and functional transfers. Prior to admission, patient's baseline is independent.  Patient is requiring grossly min assist as a result of the following impairments: decreased functional reach, decreased endurance, pain, impaired standing balance, and decreased muscular endurance. Occupational Therapy will continue to follow for duration of hospitalization.    Patient will benefit from continued skilled OT Services with no additional skilled services but increased support at home    History Related to Current Admission: Patient is a 74 year old male admitted on 4/21/2025 with Presenting Problem: s/p L5-S1 TLIF 4/21/25. Co-Morbidities : HTN, chronic  knee pain, obesity    WEIGHT BEARING RESTRICTION       Recommendations for nursing staff:   Transfers: 2-person for safety  Toileting location: commode    EVALUATION SESSION:  Patient Start of Session: supine    FUNCTIONAL TRANSFER ASSESSMENT  Sit to Stand: Edge of Bed  Edge of Bed: Minimal Assist (progressing to CGA, bed height slightly elevated)    BED MOBILITY  Supine to Sit : Moderate Assist    BALANCE ASSESSMENT     FUNCTIONAL ADL ASSESSMENT  LB Dressing Seated: Dependent (total for sock mangement, will further address LB dressing future session; reports wife able to assist if needed)    ACTIVITY TOLERANCE: limited by pain                         O2 SATURATIONS       COGNITION  Overall Cognitive Status:  WFL - within functional limits    Upper Extremity   ROM: within functional limits (B shoulder flexion approx 75% AROM, reports baseline due to rotator cuff injuries but  functional)  Strength: within functional limits     EDUCATION PROVIDED  Patient Education : Role of Occupational Therapy; Functional Transfer Techniques; Fall Prevention; Surgical Precautions; Posture/Positioning; Proper Body Mechanics  Patient's Response to Education: Verbalized Understanding; Requires Further Education    Equipment used: RW  Demonstrates functional use, Would benefit from additional trial      Therapist comments: Patient motivated and participatory, primarily limited by pain this session. Patient educated on spine precautions and incorporation into ADLs and transfers, followed with good return demo. Functional mobility between edge of bed and bedside chair via RW and min assist with increased time due to pain, cues for upright posture and RW management. See Functional Assessment sections above for additional information on patient's performance on ADLs and functional transfers this session. Anticipate with improved pain control, patient will progress to supervision level by time of discharge, however will continue to assess. Will continue to follow.     Patient End of Session: Up in chair, Needs met, Call light within reach, RN aware of session/findings, All patient questions and concerns addressed, Hospital anti-slip socks, SCDs in place, Alarm set    OCCUPATIONAL PROFILE    HOME SITUATION  Type of Home: House  Home Layout: Two level (able to stay main floor if absolutely needed)  Lives With: Spouse    Toilet and Equipment: Standard height toilet, Comfort height toilet  Shower/Tub and Equipment: Walk-in shower (built-in bench)       Occupation/Status: retired      Drives: Yes  Patient Regularly Uses: Glasses    Prior Level of Function: Patient reports typically independent in all I/ADL and functional mobility without device prior to admission; wife has been helping more with IADLs the past few weeks. Enjoys golfing.    SUBJECTIVE   \"I want to get back to swinging a golf club!\"    PAIN  ASSESSMENT  Ratin  Location: back, worse during transitions  Management Techniques: Activity promotion, Body mechanics, Breathing techniques, Repositioning, Ice    OBJECTIVE  Precautions: Spine  Fall Risk: High fall risk    ASSESSMENTS    AM-PAC ‘6-Clicks’ Inpatient Daily Activity Short Form  -   Putting on and taking off regular lower body clothing?: A Lot  -   Bathing (including washing, rinsing, drying)?: A Little  -   Toileting, which includes using toilet, bedpan or urinal? : A Little  -   Putting on and taking off regular upper body clothing?: None  -   Taking care of personal grooming such as brushing teeth?: None  -   Eating meals?: None    AM-PAC Score:  Score: 20  Approx Degree of Impairment: 38.32%  Standardized Score (AM-PAC Scale): 42.03    ADDITIONAL TESTS     NEUROLOGICAL FINDINGS      COGNITION ASSESSMENTS     PLAN  OT Device Recommendations: TBD  OT Treatment Plan: Balance activities, ADL training, Functional transfer training, Endurance training, Patient/Family education, Patient/Family training, Compensatory technique education  Rehab Potential : Good  Frequency: 3x/week  Number of Visits to Meet Established Goals: 2    ADL GOALS   Patient will perform Lower Body Dressing with supervision  Patient will perform Toileting with supervision    FUNCTIONAL TRANSFER GOALS   Patient will transfer Supine to Sit via logroll with supervision  Patient will transfer Sit to Supine via logroll with supervision  Patient will transfer to Toilet with supervision    ADDITIONAL GOALS  Patient will recall all spinal precautions and incorporate into ADLs    Patient Evaluation Complexity Level:   Occupational Profile/Medical History LOW - Brief history including review of medical or therapy records    Specific performance deficits impacting engagement in ADL/IADL LOW  1 - 3 performance deficits    Client Assessment/Performance Deficits LOW - No comorbidities nor modifications of tasks    Clinical Decision Making LOW  - Analysis of occupational profile, problem-focused assessments, limited treatment options    Overall Complexity LOW     OT Session Time: 30 minutes  Therapeutic Activity: 10 minutes

## 2025-04-22 NOTE — CM/SW NOTE
04/22/25 1432   Choice of Post-Acute Provider   Informed patient of right to choose their preferred provider Yes   List of appropriate post-acute services provided to patient/family with quality data Yes   Patient/family choice Residential HH   Information given to Patient;Spouse/Significant other   Residential HHC/Hospice financial disclosure given Yes       Met with pt/spouse at bedside to discuss DC planning. List of accepting HH agencies given. Pt/wife stated preference for DC plan for home, but are also concerned rehab may be needed. Plan to follow patient's progress with PT/OT in order to ensure home with HH as most appropriate plan. Residential HH was chosen.     [facility] reserved in AIDIN. Message sent to HH liaison. / to remain available for support and/or discharge planning.     Karlie Ontiveros, [facility]  Discharge Planner  [phone]

## 2025-04-22 NOTE — PHYSICAL THERAPY NOTE
PHYSICAL THERAPY EVALUATION - INPATIENT     Room Number: 360/360-A  Evaluation Date: 4/22/2025  Type of Evaluation: Initial  Physician Order: PT Eval and Treat    Presenting Problem: Spondylolisthesis, spinal stenosis, s/p Bilateral L5-S1 TLIF, bilateral pelvic screws, L3-4 Laminectomy 4/21  Co-Morbidities : HTN, chronic  knee pain, obesity  Reason for Therapy: Mobility Dysfunction and Discharge Planning    PHYSICAL THERAPY ASSESSMENT   Patient is a 74 year old male admitted 4/21/2025 for Spondylolisthesis, spinal stenosis, s/p Bilateral L5-S1 TLIF, bilateral pelvic screws, L3-4 Laminectomy 4/21.  Prior to admission, patient's baseline is ambulatory with difficulty.  Patient is currently functioning below baseline with bed mobility, transfers, gait, and stair negotiation.  Patient is requiring minimal assist as a result of the following impairments: decreased functional strength, decreased endurance/aerobic capacity, pain, impaired static and dynamic balance, impaired motor planning, decreased muscular endurance, and medical status.  Physical Therapy will continue to follow for duration of hospitalization.    Patient will benefit from continued skilled PT Services at discharge to promote prior level of function and safety with additional support and return home with home health PT.    PLAN DURING HOSPITALIZATION  Nursing Mobility Recommendation :  (x2 pivot)     PT Treatment Plan: Bed mobility, Body mechanics, Endurance, Energy conservation, Patient education, Family education, Gait training, Transfer training, Balance training, Strengthening, Neuromuscular re-educate  Rehab Potential : Fair  Frequency (Obs): Daily     CURRENT GOALS    Goal #1 Patient is able to demonstrate supine - sit EOB @ level: supervision     Goal #2 Patient is able to demonstrate transfers EOB to/from Chair/Wheelchair at assistance level: supervision     Goal #3 Patient is able to ambulate 50 feet with assist device: walker - rolling at  assistance level: supervision     Goal #4 Pt able to ascend and descend stairs with railing and supervision   Goal #5    Goal #6    Goal Comments: Goals established on 4/22/2025      PHYSICAL THERAPY MEDICAL/SOCIAL HISTORY  History related to current admission: Patient is a 74 year old male admitted on 4/21/2025 from home for Spondylolisthesis, spinal stenosis, s/p Bilateral L5-S1 TLIF, bilateral pelvic screws, L3-4 Laminectomy 4/21.    HOME SITUATION  Type of Home: House  Home Layout: Two level (able to stay main floor if absolutely needed)           Stairs to Bedroom: 14    Railing: Yes    Lives With: Spouse    Drives: Yes   Patient Regularly Uses: Glasses      Prior Level of [place]: Pt typically indep with ADLs and mobility, increased difficulty recently.     SUBJECTIVE  \"This is all different now\"    OBJECTIVE  Precautions: Spine  Fall Risk: High fall risk    WEIGHT BEARING RESTRICTION     PAIN ASSESSMENT  Rating: Unable to rate  Location: back  Management Techniques: Body mechanics    COGNITION  Overall Cognitive Status:  WFL - within functional limits    RANGE OF MOTION AND STRENGTH ASSESSMENT  See OT note for UE assessment    Lower extremity ROM is within functional limits      Lower extremity strength is grossly 4/5    BALANCE  Static Sitting: Fair  Dynamic Sitting: Fair  Static Standing: Poor +  Dynamic Standing: Poor +    ADDITIONAL TESTS                                    ACTIVITY TOLERANCE                         O2 WALK       NEUROLOGICAL FINDINGS                        AM-PAC '6-Clicks' INPATIENT SHORT FORM - BASIC MOBILITY  How much difficulty does the patient currently have...  Patient Difficulty: Turning over in bed (including adjusting bedclothes, sheets and blankets)?: A Little   Patient Difficulty: Sitting down on and standing up from a chair with arms (e.g., wheelchair, bedside commode, etc.): A Little   Patient Difficulty: Moving from lying on back to sitting on the side of the bed?: A  Little   How much help from another person does the patient currently need...   Help from Another: Moving to and from a bed to a chair (including a wheelchair)?: A Little   Help from Another: Need to walk in hospital room?: A Little   Help from Another: Climbing 3-5 steps with a railing?: A Little     AM-PAC Score:  Raw Score: 18   Approx Degree of Impairment: 46.58%   Standardized Score (AM-PAC Scale): 43.63   CMS Modifier (G-Code): CK    FUNCTIONAL ABILITY STATUS  Gait Assessment   Functional Mobility/Gait Assessment  Gait Assistance: Minimum assistance  Distance (ft): 3  Assistive Device: Rolling walker  Pattern:  (crouched, guarded, apprehensive)    Skilled Therapy Provided     Bed Mobility:  Rolling: NT  Supine to sit: modA   Sit to supine: NT     Transfer Mobility:  Sit to stand: Russ   Stand to sit: Russ  Gait = Russ    Therapist's Comments: RN cleared for session. Pt agreeable for therapy, received supine. Pt educated on spine precautions. Trained on log roll technique for supine<>sit for spine protection and pain mgmt. Trained on proper STS mechanics with safe placement of UE/LE for optimal force generation. Trained on sequencing of gait with RW usage. Pt limited during session by pain. Instructed to call for nursing staff for any needs and OOB mobility.     Exercise/Education Provided:  Bed mobility  Body mechanics  Energy conservation  Functional activity tolerated  Gait training  Posture  Strengthening  Transfer training    Patient End of Session: Up in chair, Needs met, Call light within reach, RN aware of session/findings, All patient questions and concerns addressed, Alarm set, Hospital anti-slip socks, Discussed recommendations with /      Patient Evaluation Complexity Level:  History High - 3 or more personal factors and/or co-morbidities   Examination of body systems Low -  addressing 1-2 elements   Clinical Presentation  Moderate - Evolving   Clinical Decision Making Low  Complexity       PT Session Time: 25 minutes  Gait Training: 10 minutes  Therapeutic Activity: 15 minutes

## 2025-04-22 NOTE — PLAN OF CARE
Pt A&Ox4, VSS on 2L O2. ANDIE w/ CPAP. IS encouraged. Tele NSR. SCDs and ankle pumps encouraged. Pain managed with PO pain meds. Surgical dressing C/D/, gel ice applied per request. Voiding via obrien, last bm 4/20. PT/OT to see. Dc when medically cleared. Call light in reach, safety measures in place.   Pt still unable to ambulate without severe pain and weakness, obrien remains in place this morning. .PT/OT to see.

## 2025-04-23 ENCOUNTER — APPOINTMENT (OUTPATIENT)
Dept: CT IMAGING | Facility: HOSPITAL | Age: 75
End: 2025-04-23
Payer: MEDICARE

## 2025-04-23 VITALS
TEMPERATURE: 98 F | WEIGHT: 236.38 LBS | HEART RATE: 60 BPM | HEIGHT: 71 IN | OXYGEN SATURATION: 97 % | BODY MASS INDEX: 33.09 KG/M2 | SYSTOLIC BLOOD PRESSURE: 126 MMHG | DIASTOLIC BLOOD PRESSURE: 73 MMHG | RESPIRATION RATE: 16 BRPM

## 2025-04-23 PROCEDURE — 97530 THERAPEUTIC ACTIVITIES: CPT

## 2025-04-23 PROCEDURE — 72131 CT LUMBAR SPINE W/O DYE: CPT

## 2025-04-23 PROCEDURE — 97116 GAIT TRAINING THERAPY: CPT

## 2025-04-23 PROCEDURE — 94760 N-INVAS EAR/PLS OXIMETRY 1: CPT

## 2025-04-23 PROCEDURE — 97535 SELF CARE MNGMENT TRAINING: CPT

## 2025-04-23 RX ORDER — ASPIRIN 325 MG
325 TABLET ORAL
Status: SHIPPED | COMMUNITY
Start: 2025-04-23

## 2025-04-23 NOTE — PROGRESS NOTES
[facility]   part of [facility]    Progress Note    Erika Aranda Patient Status:  Inpatient    9/10/1950 MRN [id number]   Location [facility] 3SW-A Attending Spencer Vergara MD   Hosp Day # 2 PCP Rocky Torres MD       S: Mild to moderate back pain with no current radicular symptoms. He notes pain down his legs with walking but has not yet tried to ambulate today with PT. He feels significant relief today compared to yesterday after completing the oral steroids. He is interested in going home today.     Inspection:  Awake alert No acute distress. No difficulty breathing     Blood pressure 129/72, pulse 64, temperature 97.7 °F (36.5 °C), temperature source Oral, resp. rate 16, height 5' 11\" (1.803 m), weight 236 lb 6.4 oz (107.2 kg), SpO2 98%.    No results for input(s): \"RBC\", \"HGB\", \"HCT\", \"MCV\", \"MCH\", \"MCHC\", \"RDW\", \"NEPRELIM\", \"WBC\", \"PLT\" in the last 168 hours.    Lumbar/Sacral Integument: Incision/ incisions;  Dressing in place and dry    Strength: Strength of bilateral lower extremities:     Left Right    EHL 5/5 5/5    DF 5/5 5/5    PF 5/5 5/5    Quads 5/5 5/5    IP 5/5 5/5  Sensation: No sensory deficits noted on bilateral lower extremities      A/P: POD # 2 s/p L5-S1 TLIF, pelvic fixation, L3-L4 laminectomy    P: Patient is doing well overall. He is cleared on our end for discharge once cleared by other services. Remove dressing POD 5. Can resume any anticoags POD 5.     Piedad Jones PA-C   25

## 2025-04-23 NOTE — PROGRESS NOTES
[facility] Hospitalist Progress Note     PCP: Rocky Torres MD    Chief Complaint: follow-up   Follow up for: The primary encounter diagnosis was Atypical nevus. Diagnoses of Medicare annual wellness visit, subsequent, Bunion, Screening cholesterol level, Obesity (BMI 35.0-39.9 without comorbidity), Essential hypertension, Screening for metabolic disorder, Impaired fasting blood sugar, Screening for cardiovascular condition, Preventative health care, Primary osteoarthritis of both knees, Need for immunization against influenza, Pain in both feet, Presbycusis of both ears, Chronic pain of both knees, and Spondylolisthesis at L5-S1 level were also pertinent to this visit.    Overnight/Interim Events:      SUBJECTIVE:  No changes overnight.     OBJECTIVE:  Temp:  [97.5 °F (36.4 °C)-98.9 °F (37.2 °C)] 97.5 °F (36.4 °C)  Pulse:  [60-72] 60  Resp:  [16-22] 16  BP: (116-134)/(62-75) 126/73  SpO2:  [97 %-98 %] 97 %    Intake/Output:    Intake/Output Summary (Last 24 hours) at 4/23/2025 1333  Last data filed at 4/23/2025 1313  Gross per 24 hour   Intake 720 ml   Output 2325 ml   Net -1605 ml       Last 3 Weights   04/21/25 0835 236 lb 6.4 oz (107.2 kg)   03/27/25 0900 255 lb (115.7 kg)   10/13/21 1258 267 lb 6.4 oz (121.3 kg)   10/05/21 1316 255 lb (115.7 kg)       Exam    General: Alert, no distress, appears stated age.     Head:  Normocephalic, without obvious abnormality, atraumatic.   Eyes:  Sclera anicteric, EOMs intact.    Nose: Nares normal,  Mucosa normal    Throat: Lips normal   Neck: Supple, symmetrical, trachea midline   Lungs:   Clear to auscultation bilaterally. Normal effort   Chest wall:  No tenderness or deformity   Heart:  Regular rate and rhythm, S1, S2 normal, no murmur, rub or gallop appreciated   Abdomen:   Soft, NT/ND, Bowel sounds normal. No masses,  No organomegaly.    Extremities: Extremities normal, atraumatic, no cyanosis or LE edema.   Skin: Skin color, texture, turgor normal. No rashes or lesions.     Neurologic: Moving all extremities spontaneously, no focal deficit appreciated      Data Review:       Labs:     No results for input(s): \"WBC\", \"HGB\", \"MCV\", \"PLT\", \"BAND\", \"INR\" in the last 168 hours.    Invalid input(s): \"LYM#\", \"MONO#\", \"BASOS#\", \"EOSIN#\"    No results for input(s): \"NA\", \"K\", \"CL\", \"CO2\", \"BUN\", \"CREATSERUM\", \"CA\", \"CAION\", \"MG\", \"PHOS\", \"GLU\" in the last 168 hours.    No results for input(s): \"ALT\", \"AST\", \"ALB\", \"AMYLASE\", \"LIPASE\", \"LDH\" in the last 168 hours.    Invalid input(s): \"ALPHOS\", \"TBIL\", \"DBIL\", \"TPROT\"    No results for input(s): \"PGLU\" in the last 168 hours.    No results for input(s): \"TROP\" in the last 168 hours.      Meds:     Scheduled Medications[1]  Medication Infusions[2]  PRN Medications[3]       Assessment/Plan:     74 year old male with PMH including but not limited to htn, hl, andie, pf ON 3L at times, CPAP at night for ANDIE who p/t EH for scheduled spine surgery by Dr. Vergara,      # SPONDYLOISTHESIS; SPINAL STENOSIS    -S/P BILATERAL LUMBAR 5 - SACRAL 1 TRANSFORAMINAL LUMBAR INTERBODY FUSION, BILATERAL PELVIC SCREWS, LUMBAR 3 - LUMBAR 4 LAMINECTOMY   -cont plan per ortho.  Continue PT/OT, increase activity as tolerated   -IS 10x/hr for atelectasis; Instructed on use of IS   -PO food/fluid as janet, bowel regimen  -crow/post-op abx per surgery  # Acute Post-op pain   -Currently on IV pain meds, will monitor and encourage transition to PO pain meds as tolerated  -Bowel regimen for associated narcotic constipation        # HTN  - resume bb  - SBP 80-100s yesterday, now 110-130s  - hold arb for renal protection     # ANDIE  -protocol     # PF  - on 3L  - pirfenidone     # DVT proph  -SCDs, hold anticoagulation/NSAIDs given spine surgery    Home going necessities:   Erika Aranda requires a semi-electric hospital bed at home due to pain from spinal stenosis Erika Aranda has a medical condition which requires positioning of the body in ways not feasible  with an ordinary bed. An order for a hospital bed has been placed due to a need to elevate pain in which they are not able to turn self and requires frequent and/or immediate changes in body positions. Erika Aranda  requires frequent changes in body position and/or has an immediate need for a change in body position.         Dispo: Dc today if cleared from ortho spine        Questions/concerns were discussed with patient by bedside. D/w RN     Total Time spent with patient and coordinating care:  51 minutes.       DO [facility]  Hospitalist  Contact via [email or web address]               [1]    atorvastatin  10 mg Oral Nightly    metoprolol succinate ER  25 mg Oral Daily Beta Blocker    pregabalin  50 mg Oral BID    Pirfenidone  801 mg Oral TID    sennosides  17.2 mg Oral Nightly    docusate sodium  100 mg Oral BID   [2]    lactated ringers Stopped (04/21/25 6608)   [3]   acetaminophen    polyethylene glycol (PEG 3350)    magnesium hydroxide    bisacodyl    fleet enema    ondansetron    metoclopramide    diphenhydrAMINE **OR** diphenhydrAMINE    benzocaine-menthol    oxyCODONE **OR** oxyCODONE    HYDROmorphone **OR** HYDROmorphone    cyclobenzaprine    diazePAM

## 2025-04-23 NOTE — OCCUPATIONAL THERAPY NOTE
OCCUPATIONAL THERAPY TREATMENT NOTE - INPATIENT     Room Number: 360/360-A  Session: 1   Number of Visits to Meet Established Goals: 2    Presenting Problem: s/p L5-S1 TLIF 4/21/25    HOME SITUATION  Type of Home: House  Home Layout: Two level (able to stay main floor if absolutely needed)  Lives With: Spouse    Toilet and Equipment: Standard height toilet, Comfort height toilet  Shower/Tub and Equipment: Walk-in shower (built-in bench)     Occupation/Status: retired      Drives: Yes  Patient Regularly Uses: Glasses    Prior Level of Function: Patient reports typically independent in all I/ADL and functional mobility without device prior to admission; wife has been helping more with IADLs the past few weeks. Enjoys golfing.    ASSESSMENT   Patient demonstrates good  progress this session, goals  have all been met .    No further skilled OT needs at this time.    History Related to Current Admission: Patient is a 74 year old male admitted on 4/21/2025 with Presenting Problem: s/p L5-S1 TLIF 4/21/25. Co-Morbidities : HTN, chronic  knee pain, obesity    WEIGHT BEARING RESTRICTION       Recommendations for nursing staff:   Transfers: 1-person  Toileting location: toilet    TREATMENT SESSION:  Patient Start of Session: chair    FUNCTIONAL TRANSFER ASSESSMENT  Sit to Stand: Edge of Bed; Chair  Edge of Bed: Supervision  Chair: Supervision  Toilet Transfer: Supervision (simulated at chair, as reports similar chair-height toilet at home, also believes has safety frame in storage and reports plans to purchase or borrow or not)    BED MOBILITY  Rolling: Modified Independent  Supine to Sit : Modified Independent (via logroll)  Sit to Supine (OT): Modified Independent (via logroll)    BALANCE ASSESSMENT     FUNCTIONAL ADL ASSESSMENT  Eating: Modified Independent  Grooming Standing: Modified Independent  UB Dressing Seated: Modified Independent (simulated)  LB Dressing Seated: Supervision (with use of AE required,  educated on AE and where to purchase, reports plans to purchase reacher and wife states will likely assist with socks)  LB Dressing Standing: Supervision  Toileting Seated: Supervision  Toileting Standing: Supervision    ACTIVITY TOLERANCE: WFL                         O2 SATURATIONS       EDUCATION PROVIDED  Patient Education : Role of Occupational Therapy; Functional Transfer Techniques; Fall Prevention; Surgical Precautions; Posture/Positioning; Proper Body Mechanics  Patient's Response to Education: Verbalized Understanding (supportive wife also present)    Equipment used: RW  Demonstrates functional use, Would benefit from additional trial      Therapist comments: Patient motivated and participatory, supportive wife at bedside throughout. Reinforced on spine precautions and incorporation into ADLs and transfers, followed with good return demo. Patient performed all ADLs and functional transfers at Supervision to Mod I level. Patient aware of recommendation for initial supervision at discharge, including supervision for shower transfers and increased assist with IADLs; patient reports will have initial supervision/assist as needed from wife at discharge. Patient and wife report no further questions or concerns regarding discharge home to ADLs.     Patient End of Session: Up in chair, Call light within reach, RN aware of session/findings, Needs met, All patient questions and concerns addressed, Hospital anti-slip socks, Alarm set, Family present    SUBJECTIVE  \"The pain is much better than yesterday!\"    PAIN ASSESSMENT  Ratin  Location: back  Management Techniques: Activity promotion, Body mechanics, Repositioning, Ice     OBJECTIVE  Precautions: Spine    AM-PAC ‘6-Clicks’ Inpatient Daily Activity Short Form  -   Putting on and taking off regular lower body clothing?: A Little (supervision)  -   Bathing (including washing, rinsing, drying)?: A Little (supervision)  -   Toileting, which includes using toilet,  bedpan or urinal? : A Little (supervision)  -   Putting on and taking off regular upper body clothing?: None  -   Taking care of personal grooming such as brushing teeth?: None  -   Eating meals?: None    AM-PAC Score:  Score: 21  Approx Degree of Impairment: 32.79%  Standardized Score (AM-PAC Scale): 44.27    PLAN  OT Device Recommendations: Reacher, Sock aid (safety frame)  OT Treatment Plan: Balance activities, ADL training, Functional transfer training, Endurance training, Patient/Family education, Patient/Family training, Compensatory technique education  Rehab Potential : Good  Frequency: 3x/week    Goals all met 4/23  ADL GOALS   Patient will perform Lower Body Dressing with supervision  Patient will perform Toileting with supervision    FUNCTIONAL TRANSFER GOALS   Patient will transfer Supine to Sit via logroll with supervision  Patient will transfer Sit to Supine via logroll with supervision  Patient will transfer to Toilet with supervision    ADDITIONAL GOALS  Patient will recall all spinal precautions and incorporate into ADLs    OT Session Time: 40 minutes  Self-Care Home Management: 25 minutes  Therapeutic Activity: 15 minutes

## 2025-04-23 NOTE — HOME CARE LIAISON
Received referral via [employer] for Home Health services. Spoke w/ patient at the bedside and is agreeable for all home health care needs. Patient is unsure if dcing home or to rehab .Will remain available for any and all home health care needs

## 2025-04-23 NOTE — PLAN OF CARE
Pt worked with therapy and met goals for dc to home with HH today.  Pain controlled with ordered meds.  Seen by hospitalist and spine service.  Written and verbal dc instructions given to pt and spouse.  Verbalized understanding.  Will dc when wheel chair arrives.

## 2025-04-23 NOTE — CM/SW NOTE
Hospital Bed:   Erika rAanda requires a semi-electric hospital bed at home due to pain from spinal stenosis Erika Aranda has a medical condition which requires positioning of the body in ways not feasible with an ordinary bed. An order for a hospital bed has been placed due to a need to elevate pain in which they are not able to turn self and requires frequent and/or immediate changes in body positions. Erika Aranda  requires frequent changes in body position and/or has an immediate need for a change in body position.                   .

## 2025-04-23 NOTE — PHYSICAL THERAPY NOTE
PHYSICAL THERAPY TREATMENT NOTE - INPATIENT    Room Number: 360/360-A     Session: 1     Number of Visits to Meet Established Goals: 4    Presenting Problem: Spondylolisthesis, spinal stenosis, s/p Bilateral L5-S1 TLIF, bilateral pelvic screws, L3-4 Laminectomy 4/21  Co-Morbidities : HTN, chronic  knee pain, obesity    PHYSICAL THERAPY MEDICAL/SOCIAL HISTORY  History related to current admission: Patient is a 74 year old male admitted on 4/21/2025 from home for Spondylolisthesis, spinal stenosis, s/p Bilateral L5-S1 TLIF, bilateral pelvic screws, L3-4 Laminectomy 4/21.     HOME SITUATION  Type of Home: House  Home Layout: Two level (able to stay main floor if absolutely needed)           Stairs to Bedroom: 14    Railing: Yes    Lives With: Spouse    Drives: Yes   Patient Regularly Uses: Glasses       Prior Level of [place]: Pt typically indep with ADLs and mobility, increased difficulty recently.     PHYSICAL THERAPY ASSESSMENT   Patient demonstrates good  progress this session, goals  remain in progress.      Patient is requiring supervision and stand-by assist as a result of the following impairments: pain.     Patient continues to function near baseline with bed mobility and gait.  Next session anticipate patient to progress bed mobility, transfers, and gait.  Physical Therapy will continue to follow patient for duration of hospitalization.    Patient continues to benefit from continued skilled PT services: at discharge to promote prior level of function and safety with additional support and return home with home health PT.    PLAN DURING HOSPITALIZATION  Nursing Mobility Recommendation :  (x2 pivot)     PT Treatment Plan: Bed mobility, Body mechanics, Endurance, Energy conservation, Patient education, Family education, Gait training, Transfer training, Balance training, Strengthening, Neuromuscular re-educate  Frequency (Obs): Daily     CURRENT GOALS     Goal #1 Patient is able to demonstrate supine - sit  EOB @ level: supervision      Goal #2 Patient is able to demonstrate transfers EOB to/from Chair/Wheelchair at assistance level: supervision      Goal #3 Patient is able to ambulate 50 feet with assist device: walker - rolling at assistance level: supervision      Goal #4 Pt able to ascend and descend stairs with railing and supervision   Goal #5     Goal #6     Goal Comments: Goals established on 4/22/2025 4/23/2025 all goals ongoing    SUBJECTIVE  \"I am doing so much better today\"    OBJECTIVE  Precautions: Spine    WEIGHT BEARING RESTRICTION     PAIN ASSESSMENT   Rating: Unable to rate  Location: back  Management Techniques: Body mechanics    BALANCE                                                                                                                       Static Sitting: Fair  Dynamic Sitting: Fair           Static Standing: Poor +  Dynamic Standing: Poor +    ACTIVITY TOLERANCE                         O2 WALK       AM-PAC '6-Clicks' INPATIENT SHORT FORM - BASIC MOBILITY  How much difficulty does the patient currently have...  Patient Difficulty: Turning over in bed (including adjusting bedclothes, sheets and blankets)?: A Little   Patient Difficulty: Sitting down on and standing up from a chair with arms (e.g., wheelchair, bedside commode, etc.): A Little   Patient Difficulty: Moving from lying on back to sitting on the side of the bed?: A Little   How much help from another person does the patient currently need...   Help from Another: Moving to and from a bed to a chair (including a wheelchair)?: A Little   Help from Another: Need to walk in hospital room?: A Little   Help from Another: Climbing 3-5 steps with a railing?: A Little     AM-PAC Score:  Raw Score: 18   Approx Degree of Impairment: 46.58%   Standardized Score (AM-PAC Scale): 43.63   CMS Modifier (G-Code): CK    FUNCTIONAL ABILITY STATUS  Gait Assessment   Functional Mobility/Gait Assessment  Gait Assistance: Supervision  Distance  (ft): 40  Assistive Device: Rolling walker  Pattern:  (crouched, guarded, apprehensive)  Stairs: Stairs  How Many Stairs: 4  Device: 2 Rails  Assist: Supervision  Pattern: Ascend and Descend  Ascend and Descend : Step to    Skilled Therapy Provided    Bed Mobility:  Rolling: SBA   Supine<>Sit: SBA   Sit<>Supine: NT     Transfer Mobility:  Sit<>Stand: SBA   Stand<>Sit: SBA   Gait: SBA with RW    Therapist's Comments: improved confidence - still with occasional RLE mm spasms, but able to breathe through them      THERAPEUTIC EXERCISES  Lower Extremity Ankle pumps  Heel slides     Upper Extremity none     Position Supine     Repetitions   10   Sets   1     Patient End of Session: Up in chair, Call light within reach, Needs met, RN aware of session/findings, All patient questions and concerns addressed    PT Session Time: 23 minutes  Gait Trainin minutes  Therapeutic Activity: 15 minutes  Therapeutic Exercise: 0 minutes   Neuromuscular Re-education: 0 minutes

## 2025-04-23 NOTE — PLAN OF CARE
Patient A&Ox4. VSS on 5L O2. Tele; NSR. Severe pain reported to back, pain medication regimen given, see MAR. Gauze and ioaband dressing x2 to back C/D/I, ice therapy applied. Plan to possibly DC today with H. Safety precautions in place, call light within reach. Patient updated on plan of care.

## 2025-04-23 NOTE — CM/SW NOTE
04/22/25 1400   Discharge disposition   Expected discharge disposition Home-Health   Post Acute Care Provider Residential   DME/Infusion Providers [facility]     [facility] has Hospital Bed approved but cannot deliver until tomorrow.  Wife /pt in agreement.  DC planned for today.    Kassandra Bowles LCSW  /Discharge Planner

## 2025-04-23 NOTE — CM/SW NOTE
Sw met with pt and wife re: dc planning.  Pt/wife requesting hospital bed for home. Referral sent to [facility] in [place].  Need MD to cosign order and  verbaige to  his progress note.  Mary from [facility] aware of order.    Kassandra Bowles, MARCELLEW  /Discharge Planner

## 2025-04-24 NOTE — PAYOR COMM NOTE
--------------  DISCHARGE REVIEW    Payor: [employer]  Subscriber #:  [id number]  Authorization Number: [id number]    Admit date: 4/21/25  Admit time:   8:22 AM  Discharge Date: 4/23/2025  3:26 PM     Admitting Physician: Spencer Vergara MD  Attending Physician:  No att. providers found  Primary Care Physician: Rocky Torres MD       REVIEWER COMMENTS  POD # 2 s/p L5-S1 TLIF, pelvic fixation, L3-L4 laminectomy     P: Patient is doing well overall. He is cleared on our end for discharge once cleared by other services. Remove dressing POD 5. Can resume any anticoags POD 5.

## 2025-04-24 NOTE — OPERATIVE REPORT
Operative Note     Patient Name:Erika Aranda  Date: 4/21/2025  Preoperative Diagnosis:   Lumbar Stenosis with Neurogenic Claudication L3-4  Lumbar Degenerative Spondylolisthesis L5-S1  Lumbar Foraminal Stenosis with Radiculopathy    Postoperative Diagnosis: Same  Primary Surgeon: Spencer Vergara MD  Assistant: Piedad LOCKHART    Procedures: L5-S1 TLIF - bilateral, Pelvic Screws, L3-4 laminectomy  - Lumbar decompression/TLIF   CPT 88068   - Posterior Instrumentation L5-S1   CPT 35764  - Interbody biomechanical device   CPT 22853 x 1  - Decompression L5-S1    CPT 35316  - Pelvic Screws     CPT 28016  - Laminectomy L3-4      CPT 20426      Anesthesia: General Endotracheal Anesthesia  Estimated Blood Loss: 100cc  Drains: None  Implants: Nuvasive Relign 7.5x45, 8.5x90 Nuvasive TLX Size 26x2  Bone Graft: ifactor 2.5cc, osteocel 10cc local bone 10cc  Specimen: None  Condition: Stable  Complications: None      Surgical Indications:Erika Aranda is a  74 year old malewho presented with a history of lumbar disease with symptoms refractory to nonsurgical care.  The workup including lumbar radiographs and MRI. The option of surgery was discussed with the patient.  Risks, benefits, and alternatives of surgery were discussed with the patient, which include but are not limited to bleeding, infection, DVT/PE, dural tears, neurologic injury, worsening of neurological status, risk of instability, need for subsequent surgery, fracture, failure of fusion, hardware failure, risks associated with anesthesia, including death, which were all reviewed with the patient and she consented to proceed with surgery.     Surgical Procedure:              The patient was met in the preop holding area, we reviewed elements of the patient's history and physical examination along with the planned surgical procedure. The patient was in agreement and the surgical site was marked with indelible ink.  The patient was administered  prophylactic antibiotics per SCIP guidelines.  After successful induction of general endotracheal anesthesia sequential compression devices were applied to bilateral lower extremities.  The patient was then placed in the prone position on the Nir spine frame.  The patient's orbits, peripheral nerves, and bony prominences were padded and protected.  The back was then prepped and draped in the usual sterile fashion.  A safety timeout was performed identifying the patient by name, MRN, and date of birth; the nature of the procedure, surgical site, and concerns were addressed with the operating room staff.  All were in agreement and we proceeded with the procedure.                 AP and lateral flouroscopic images were taken throughout the procedure to aid in instrumentation placement. Perfect APs were used to subhash out the lateral border of the pedicles bilaterally at L5-S1. Skin incision was made 1cm lateral to this marks. Electocautery was used to dissect through the subcutaneous tissue and the fascia. Blunt finger dissection was used to develop the plane between the multifidus and remaining paraspinal muscles until the facet joint, pars and transverse process was palpated. Pedicle screws were placed using standard fluoroscopically guided pedicle screw technique.  Jamshidis' were placed at the lateral border of the pedicle and inserted to a depth of 25-30mm using AP flouro imaging. Kwires were then inserted. This was completed for all the remaining pedicles bilaterally. Lateral flouro image was taken to ensure appropriate wire trajectory. Pedicles screws of appropriate diameter and length (as assessed using pre operative imaging) were then placed at each level and positioned confirmed with AP/Lat flouro images and EMG probing of screw heads. All screws simulated well on jamshidi monitoring. Prior placement of screw, a 10cc syringe with needle was placed over 1 of the cannulated pedicles and 10cc of bone marrow  aspirate was obtained. This was mixed with our local bone graft and allograft to improve osteogenic potential.      With the appropriate screws placed, attention was directed to the TLIF at L5-S1, first on the left. A table mounted retractor was secured to the ipsilateral screws and secured to the table for rigidity. A medial blade was used after elevation of the multifidus off the lamina. The inferior facet and superior and medial aspect of the superior articular facet was osteotomized. Bone was removed, milled and saved for later bone grafting.   The exiting nerve root was exposed and decompressed. The ligamentum flavum was removed exposing the dura medially and decompressing the traversing nerve root. The thecal sac and nerve roots were then protected with neurosponges, and gently retracted to reveal the annulus of the intervertebral disk. An annulotomy was performed.  With the sequential use of pituitary rongeurs, disc marilee, a variety of curettes, and rasps, a thorough discectomy ensued with care not to violate the subchondral endplates of the adjacent vertebral bodies. The disc was cleared, the same steps were repeated on the right side for a bilateral approach and decompression. With the endplates prepared, the disc space was then appropriately sized with sequential trialing under fluoroscopic control. The integrity of the anterior annulus was inspected and then biologic allograft and local bone graft was delivered to the anterior disc space. An appropriate corresponding interbody implant was then selected, packed with bone graft on the back table, and then implanted under fluoroscopic control on the right and the left side. The remaining disk space was then backfilled with bone graft. The wound was then copiously irrigated. 100mg of vanco powder was used in the wound, 40mg of depomedrol was mixed with the remaining floseal and placed over the nerve roots.  On the contralateral side to the approach, a  retractor was placed. The soft tissue was cleaned off the facet joint with a bovie. The facet joint was burred down aggressively and bone graft packed into the area  to promote posterior facet joint fusion    Pelvic screws were placed using flouroscopic assistance. The teardrop technique was used. The cannulated steffe probe was advanced under xray guidance. Once confirmed appropriate placement, a kwire was placed and checked on the inlet view. The screw was advanced under xray guidance and checked again on the teardrop view.     2 rods of appropriate length were selected and contoured to shape and reduced to the tulip heads. The caps were then tightened to the screw 's predetermined specification utilizing a torque . The construct was the evaluated fluoroscopically and determined to be appropriate.      At L3-4, using a spinal needle a fluoroscopic image  was taken to localize the skin incision relative to the patient's anatomy. The skin was incised with a scalpel and subperiosteal dissection was carried out with electrocautery exposing the L3-4 interlaminar space. Curved curette was placed in the interlaminar space and fluoroscopic image confirmed appropriate level.  A partial laminotomy was performed on the left side at L3-4 until the top of the ligamentum flavum was exposed. Ligamentum was then released with a curved curette and then removed .  A partial medial facetectomy was performed out to the medial wall of the L4 pedicle.  The ligamentum flavum was removed en bloc. A boggs ball could be passed around the thecal sac, root, and out to the neural foramen without any resistance.  At the completion of the procedure bilateral L4 nerve root and dural sac was freely mobile.                    All bleeders were controlled using bipolar electrocautery and floseal.  There was no active bleeding.  Closure was done in layers with interrupted 0 Vicryl for the fascia, 2-0 Vicryl for the subcutaneous  tissue.  The subcutaneous layer was injected with 0.375% Marcaine and the skin was closed with a Monocryl suture.  Dermabond and a sterile dressing were then applied.  The patient was woken from anesthesia and transferred to the PACU in stable condition.                   A physician assistant was necessary during the case to provide positioning, exposure, hemostasis, safety and retraction and to manage wound suction so that I could operate with two hands. The PA aided in screw targeting on their side and debbie insertion     Spencer Vergara MD  Division of Spine Surgery  [facility]

## 2025-04-28 NOTE — DISCHARGE SUMMARY
[facility]  Discharge Summary    Erika Aranda Patient Status:  Inpatient    9/10/1950 MRN [id number]   Location [facility] 3SW-A Attending No att. providers found   Hosp Day # 2 PCP Rocky Torres MD     Date of Admission: 2025    Date of Discharge: 2025  3:26 PM    Admitting Diagnosis: SPONDYLOISTHESIS; SPINAL STENOSIS  Lumbar radiculopathy    Discharge Diagnosis: Problem List[1]    Hospital course:  The patient was admitted on above date to undergo elective surgical intervention understanding risks and benefits to surgery after failing conservative care.  Patient underwent   Surgical Procedures       Case IDs Date Procedure Surgeon Location Status    [id number] 25 BILATERAL LUMBAR 5 - SACRAL 1 TRANSFORAMINAL LUMBAR INTERBODY FUSION, BILATERAL PELVIC SCREWS, LUMBAR 3 - LUMBAR 4 LAMINECTOMY Spencer Vergara MD  MAIN OR Comp          Afterward, pt was brought to the PACU in stable condition.  After the recovery room the patient was transferred to the floor using standard protocol orders.  Once on the floor the patient was followed by spine service and medical services as well as appropriate ancillary consultations throughout the hospital stay.  The patient participated in Physical and Occupational Therapy making steady progressive gains.  Once deemed stable by all services the patient was discharged on the above date.  Standard discharge instructions were given and they were asked to follow up in clinic in 2 weeks.     Disposition: Home Health Care Services      Discharge Medications:   Discharge Medication List as of 2025  2:51 PM        CONTINUE these medications which have CHANGED    Details   aspirin 325 MG Oral Tab Take 1 tablet (325 mg total) by mouth. Resume taking this on , Historical           CONTINUE these medications which have NOT CHANGED    Details   atorvastatin 10 MG Oral Tab Take 1 tablet (10 mg total) by mouth nightly., Historical      Calcium Carbonate  (CALCIUM 500 OR) Take 1,000 mg by mouth daily., Historical      Ascorbic Acid (VITAMIN C) 1000 MG Oral Tab Take 1 tablet (1,000 mg total) by mouth daily., Historical      Pirfenidone 267 MG Oral Cap Take 3 capsules by mouth in the morning, at noon, and at bedtime. PULMONARY FIBROSIS, Historical      metoprolol succinate ER 25 MG Oral Tablet 24 Hr Take 1 tablet (25 mg total) by mouth in the morning., Historical      fluticasone propionate 50 MCG/ACT Nasal Suspension 1 spray by Each Nare route nightly., Historical      loratadine 10 MG Oral Tab Take 1 tablet (10 mg total) by mouth in the morning., Historical      Irbesartan 300 MG Oral Tab Take 1 tablet (300 mg total) by mouth daily., Normal, Disp-90 tablet, R-3Requesting 1 year supply      oxyCODONE-acetaminophen 5-325 MG Oral Tab Take 1 tablet by mouth every 6 (six) hours as needed for Pain. Post op, Historical      cyclobenzaprine 10 MG Oral Tab Take 1 tablet (10 mg total) by mouth 3 (three) times daily as needed for Muscle spasms. Post op, Historical           STOP taking these medications       CHOLECALCIFEROL OR        naproxen 500 MG Oral Tab              Spencer Vergara MD  4/28/2025  12:48 PM       [1]   Patient Active Problem List  Diagnosis    Essential hypertension    Chronic pain of both knees    Primary osteoarthritis of both knees    Obesity (BMI 35.0-39.9 without comorbidity)    Screening PSA (prostate specific antigen)    Screening cholesterol level    Need for immunization against influenza    Screening for cardiovascular condition    Bunion    Pain in both feet    Impaired fasting blood sugar    Presbycusis of both ears    Screening for metabolic disorder    Atypical nevus    Lumbar radiculopathy

## (undated) DEVICE — #15 STERILE STAINLESS BLADE: Brand: STERILE STAINLESS BLADES

## (undated) DEVICE — MARKER SKIN PREP-RESISTANT ST: Brand: MEDLINE INDUSTRIES, INC.

## (undated) DEVICE — INSULATED BLADE ELECTRODE: Brand: EDGE

## (undated) DEVICE — SPONGE,NEURO,0.5"X1.5",XR,STRL,LF,10/PK: Brand: MEDLINE

## (undated) DEVICE — SUT VCRL 0 18IN CT-1 ABSRB VLT CR L36MM 1/2

## (undated) DEVICE — CODMAN® SURGICAL PATTIES 1/2" X 1/2" (1.27CM X 1.27CM): Brand: CODMAN®

## (undated) DEVICE — KIT POSITIONING PROAXIS PRONEV

## (undated) DEVICE — NEEDLE NRV STIM BVL TIP INSUL PEDCL ACCS SYS

## (undated) DEVICE — ADHESIVE SKIN TOP FOR WND CLSR DERMBND ADV

## (undated) DEVICE — GLOVE SUR 7.5 SENSICARE PI PIP CRM PWD F

## (undated) DEVICE — SLEEVE COMPR MD KNEE LEN SGL USE KENDALL SCD

## (undated) DEVICE — MONITORING NEUROPHYSIOLOGICAL

## (undated) DEVICE — Device

## (undated) DEVICE — COVER,LIGHT,CAMERA,HARD,1/PK,STRL: Brand: MEDLINE

## (undated) DEVICE — TRAY INSTR PWR NUVASIVE

## (undated) DEVICE — GLOVE SUR 8.5 SENSICARE SHLD PIP BLK PWD F

## (undated) DEVICE — GLOVE SUR 7.5 SENSICARE SHLD PIP BLK PWD F

## (undated) DEVICE — INSULATED BLADE ELECTRODE;CAUTION: FOR MANUFACTURING, PROCESSING, OR REPACKING.: Brand: EDGE

## (undated) DEVICE — 4-PORT MANIFOLD: Brand: NEPTUNE 2

## (undated) DEVICE — SYSTEM DEL GRFT MOD MAS

## (undated) DEVICE — 450 ML BOTTLE OF 0.05% CHLORHEXIDINE GLUCONATE IN 99.95% STERILE WATER FOR IRRIGATION, USP AND APPLICATOR.: Brand: IRRISEPT ANTIMICROBIAL WOUND LAVAGE

## (undated) DEVICE — MICRO KOVER: Brand: UNBRANDED

## (undated) DEVICE — APPLICATOR PREP 26ML CHG 2% ISO ALC 70%

## (undated) DEVICE — SUT MCRYL 3-0 27IN ABSRB UD 19MM PS-2 3/8

## (undated) DEVICE — WRAP THERAPEUTIC BACK WO GEL P

## (undated) DEVICE — C-ARM: Brand: UNBRANDED

## (undated) DEVICE — SLIM BODY SKIN STAPLER: Brand: APPOSE ULC

## (undated) DEVICE — 3M™ IOBAN™ 2 ANTIMICROBIAL INCISE DRAPE 6650EZ: Brand: IOBAN™ 2

## (undated) DEVICE — C-ARMOR C-ARM EQUIPMENT COVERS CLEAR STERILE UNIVERSAL FIT 12 PER CASE: Brand: C-ARMOR

## (undated) DEVICE — SOLUTION IRRIG 1000ML 0.9% NACL USP BTL

## (undated) DEVICE — FINE BLADE: Brand: MILL PLUS

## (undated) DEVICE — COVER,TABLE,44X90,STERILE: Brand: MEDLINE

## (undated) DEVICE — HEMOSTAT KIT SURGIFLO THROM 8ML

## (undated) DEVICE — LAMINECTOMY CDS: Brand: MEDLINE INDUSTRIES, INC.

## (undated) DEVICE — SUT COAT VCRL+ 0 27IN UR-6 ABSRB VLT ANTIBACT

## (undated) DEVICE — GOWN SUR 2XL LEV 4 BLU W/ WHT V NK BND AERO

## (undated) DEVICE — SUT COAT VCRL 0 27IN CT-1 ABSRB UD 36MM 1/2

## (undated) DEVICE — YANKAUER,FLEXIBLE HANDLE,REGLR CAPACITY: Brand: MEDLINE INDUSTRIES, INC.

## (undated) DEVICE — SYRINGE MED 10ML LL TIP W/O SFTY DISP

## (undated) DEVICE — 3.0MM PRECISION NEURO (MATCH HEAD)

## (undated) DEVICE — BLADE ELECTRODE: Brand: EDGE

## (undated) DEVICE — SUT COAT VCRL+ 2-0 18IN CP-2 ABSRB UD ANTIBAC

## (undated) DEVICE — KIT ACCS MAS TRANSFORAMINAL LUM IF 2 MAXCESS

## (undated) NOTE — LETTER
OUTSIDE TESTING RESULT REQUEST     IMPORTANT: FOR YOUR IMMEDIATE ATTENTION  Please FAX all test results listed below to: [phone]     Testing already done on or about: 3/21/25     * * * * If testing is NOT complete, arrange with patient A.S.A.P. * * * *      Patient Name: Erika Aranda  Surgery Date: 2025  Medical Record: [id number]  CSN: [id number]  : 9/10/1950 - A: 74 y     Sex: male  Surgeon(s):  Spencer Vergara MD  Procedure: LUMBAR 5 - SACRAL 1 TRANSFORAMINAL LUMBAR INTERBODY FUSION, BILATERAL PELVIC SCREWS, LUMBAR 5 - SACRAL 1 SMITH SETHI OSTEOTOMIES, LUMBAR 3 - LUMBAR 4 LAMINECTOMY  Anesthesia Type: General     Surgeon: Spencer Vergara MD     The following Testing and Time Line are REQUIRED PER ANESTHESIA     EKG READ AND SIGNED WITHIN   90 days WITH TRACING       Thank You,   Sent by:SHAMEKA ASHFORD